# Patient Record
Sex: FEMALE | Race: WHITE | NOT HISPANIC OR LATINO | Employment: UNEMPLOYED | RURAL
[De-identification: names, ages, dates, MRNs, and addresses within clinical notes are randomized per-mention and may not be internally consistent; named-entity substitution may affect disease eponyms.]

---

## 2024-03-14 ENCOUNTER — OFFICE VISIT (OUTPATIENT)
Dept: FAMILY MEDICINE CLINIC | Facility: CLINIC | Age: 54
End: 2024-03-14
Payer: MEDICAID

## 2024-03-14 VITALS
OXYGEN SATURATION: 98 % | WEIGHT: 206 LBS | RESPIRATION RATE: 18 BRPM | HEART RATE: 96 BPM | HEIGHT: 64 IN | DIASTOLIC BLOOD PRESSURE: 78 MMHG | BODY MASS INDEX: 35.17 KG/M2 | SYSTOLIC BLOOD PRESSURE: 118 MMHG | TEMPERATURE: 97 F

## 2024-03-14 DIAGNOSIS — N95.1 MENOPAUSE SYNDROME: ICD-10-CM

## 2024-03-14 DIAGNOSIS — F51.01 PRIMARY INSOMNIA: ICD-10-CM

## 2024-03-14 DIAGNOSIS — E78.00 ELEVATED LDL CHOLESTEROL LEVEL: ICD-10-CM

## 2024-03-14 DIAGNOSIS — F33.1 MODERATE EPISODE OF RECURRENT MAJOR DEPRESSIVE DISORDER: ICD-10-CM

## 2024-03-14 DIAGNOSIS — Z00.00 ENCOUNTER FOR ANNUAL PHYSICAL EXAM: ICD-10-CM

## 2024-03-14 DIAGNOSIS — Z12.11 SCREENING FOR COLON CANCER: ICD-10-CM

## 2024-03-14 DIAGNOSIS — J30.2 SEASONAL ALLERGIES: ICD-10-CM

## 2024-03-14 DIAGNOSIS — R53.82 CHRONIC FATIGUE: ICD-10-CM

## 2024-03-14 DIAGNOSIS — E11.65 TYPE 2 DIABETES MELLITUS WITH HYPERGLYCEMIA, WITHOUT LONG-TERM CURRENT USE OF INSULIN: ICD-10-CM

## 2024-03-14 DIAGNOSIS — Z11.59 ENCOUNTER FOR HEPATITIS C SCREENING TEST FOR LOW RISK PATIENT: ICD-10-CM

## 2024-03-14 DIAGNOSIS — Z76.89 ENCOUNTER TO ESTABLISH CARE: Primary | ICD-10-CM

## 2024-03-14 DIAGNOSIS — I10 PRIMARY HYPERTENSION: ICD-10-CM

## 2024-03-14 DIAGNOSIS — E55.9 VITAMIN D DEFICIENCY: ICD-10-CM

## 2024-03-14 DIAGNOSIS — Z23 NEED FOR PNEUMOCOCCAL 20-VALENT CONJUGATE VACCINATION: ICD-10-CM

## 2024-03-14 RX ORDER — ESTRADIOL 1 MG/1
TABLET ORAL
COMMUNITY
End: 2024-03-14 | Stop reason: SDUPTHER

## 2024-03-14 RX ORDER — METFORMIN HYDROCHLORIDE 500 MG/1
1000 TABLET, EXTENDED RELEASE ORAL 2 TIMES DAILY
Qty: 180 TABLET | Refills: 1 | Status: SHIPPED | OUTPATIENT
Start: 2024-03-14

## 2024-03-14 RX ORDER — ZOLPIDEM TARTRATE 10 MG/1
10 TABLET ORAL NIGHTLY PRN
Qty: 30 TABLET | Refills: 2 | Status: SHIPPED | OUTPATIENT
Start: 2024-03-14

## 2024-03-14 RX ORDER — SERTRALINE HYDROCHLORIDE 100 MG/1
1 TABLET, FILM COATED ORAL
COMMUNITY
End: 2024-03-14 | Stop reason: SDUPTHER

## 2024-03-14 RX ORDER — ESTRADIOL 1 MG/1
1 TABLET ORAL DAILY
Qty: 90 TABLET | Refills: 1 | Status: SHIPPED | OUTPATIENT
Start: 2024-03-14

## 2024-03-14 RX ORDER — METFORMIN HYDROCHLORIDE 500 MG/1
TABLET, EXTENDED RELEASE ORAL
COMMUNITY
End: 2024-03-14 | Stop reason: SDUPTHER

## 2024-03-14 RX ORDER — SERTRALINE HYDROCHLORIDE 100 MG/1
100 TABLET, FILM COATED ORAL
Qty: 90 TABLET | Refills: 1 | Status: SHIPPED | OUTPATIENT
Start: 2024-03-14

## 2024-03-14 RX ORDER — FEXOFENADINE HCL 180 MG/1
180 TABLET ORAL DAILY
Qty: 90 TABLET | Refills: 1 | Status: SHIPPED | OUTPATIENT
Start: 2024-03-14 | End: 2024-03-14

## 2024-03-14 RX ORDER — LORATADINE 10 MG/1
10 TABLET ORAL DAILY
Qty: 30 TABLET | Refills: 3 | Status: SHIPPED | OUTPATIENT
Start: 2024-03-14

## 2024-03-14 RX ORDER — LISINOPRIL 2.5 MG/1
2.5 TABLET ORAL DAILY
Qty: 90 TABLET | Refills: 1 | Status: SHIPPED | OUTPATIENT
Start: 2024-03-14

## 2024-03-14 NOTE — PROGRESS NOTES
"Chief Complaint   Patient presents with    [facility]     New patient         Amarilys Durbin is a 53 y.o. female who presents today to [facility].    HPI   She and her  just moved here and need a new PCP.   She had a hysterectomy in 2009 and is still suffering menopausal syndrome.   She has been treated effectively for depression/anxiety with zoloft since 1995.  She is a type 2 diabetic somewhat noncompliant with medication and diet. She is also not on an ACE to protect her kidneys.   She is requesting to get back on ambien for insomnia.   She has seasonal allergies.   Allergies   Allergen Reactions    Hydrocodone Itching and Hives         OBJECTIVE:  Vitals:    03/14/24 1357   BP: 118/78   Pulse: 96   Resp: 18   Temp: 97 °F (36.1 °C)   TempSrc: Temporal   SpO2: 98%   Weight: 93.4 kg (206 lb)   Height: 162.6 cm (64\")     Physical Exam    Class 2 Severe Obesity (BMI >=35 and <=39.9). Obesity-related health conditions include the following: diabetes mellitus. Obesity is newly identified. BMI is is above average; BMI management plan is completed. We discussed low calorie, low carb based diet program, portion control, and increasing exercise.          ASSESSMENT/ PLAN:    Diagnoses and all orders for this visit:    1. Encounter to establish care (Primary)    2. Screening for colon cancer  -     Cologuard - Stool, Per Rectum; Future    3. Primary hypertension  -     lisinopril (Zestril) 2.5 MG tablet; Take 1 tablet by mouth Daily.  Dispense: 90 tablet; Refill: 1  -     CBC Auto Differential; Future  -     Comprehensive Metabolic Panel; Future    4. Type 2 diabetes mellitus with hyperglycemia, without long-term current use of insulin  -     metFORMIN ER (GLUCOPHAGE-XR) 500 MG 24 hr tablet; Take 2 tablets by mouth 2 (Two) Times a Day.  Dispense: 180 tablet; Refill: 1  -     Hemoglobin A1c; Future  -     MicroAlbumin, Urine, Random - Urine, Clean Catch; Future    5. Menopause syndrome  -     " estradiol (ESTRACE) 1 MG tablet; Take 1 tablet by mouth Daily.  Dispense: 90 tablet; Refill: 1  -     FSH & LH; Future  -     Prolactin; Future  -     Testosterone; Future  -     Estrogens, Total; Future  -     Progesterone; Future    6. Moderate episode of recurrent major depressive disorder  -     sertraline (ZOLOFT) 100 MG tablet; Take 1 tablet by mouth every night at bedtime.  Dispense: 90 tablet; Refill: 1    7. Seasonal allergies  -     Discontinue: fexofenadine (Allegra Allergy) 180 MG tablet; Take 1 tablet by mouth Daily.  Dispense: 90 tablet; Refill: 1  -     loratadine (Claritin) 10 MG tablet; Take 1 tablet by mouth Daily.  Dispense: 30 tablet; Refill: 3    8. Primary insomnia  -     zolpidem (AMBIEN) 10 MG tablet; Take 1 tablet by mouth At Night As Needed for Sleep.  Dispense: 30 tablet; Refill: 2    9. Need for pneumococcal 20-valent conjugate vaccination  -     Pneumococcal Conjugate Vaccine 20-Valent (PCV20)    10. Encounter for annual physical exam  -     CBC Auto Differential; Future  -     Comprehensive Metabolic Panel; Future  -     Lipid Panel With LDL / HDL Ratio; Future  -     T4 & TSH ([facility]); Future    11. Vitamin D deficiency    12. Elevated LDL cholesterol level  -     CBC Auto Differential; Future  -     Comprehensive Metabolic Panel; Future  -     Lipid Panel With LDL / HDL Ratio; Future    13. Chronic fatigue  -     T4 & TSH ([facility]); Future      Procedures     Management Plan:   She will come in tomorrow for fasting labs.  An After Visit Summary was printed and given to the patient at discharge.    Follow-up: Return for ANNUAL PHYSICAL WITH LABS PRIOR.    I spent 50 minutes caring for Marisela on this date of service. This time includes time spent by me in the following activities: preparing for the visit, obtaining and/or reviewing a separately obtained history, performing a medically appropriate examination and/or evaluation, counseling and educating the patient/family/caregiver,  ordering medications, tests, or procedures, documenting information in the medical record, and independently interpreting results and communicating that information with the patient/family/caregiver.      Manpreet Kidd, APRN 3/14/2024 16:59 CDT  This note was electronically signed.

## 2024-03-15 ENCOUNTER — PATIENT ROUNDING (BHMG ONLY) (OUTPATIENT)
Dept: FAMILY MEDICINE CLINIC | Facility: CLINIC | Age: 54
End: 2024-03-15
Payer: MEDICAID

## 2024-03-21 ENCOUNTER — OFFICE VISIT (OUTPATIENT)
Dept: FAMILY MEDICINE CLINIC | Facility: CLINIC | Age: 54
End: 2024-03-21
Payer: MEDICAID

## 2024-03-21 VITALS
TEMPERATURE: 97 F | HEART RATE: 93 BPM | DIASTOLIC BLOOD PRESSURE: 80 MMHG | BODY MASS INDEX: 35 KG/M2 | RESPIRATION RATE: 18 BRPM | OXYGEN SATURATION: 98 % | WEIGHT: 205 LBS | SYSTOLIC BLOOD PRESSURE: 118 MMHG | HEIGHT: 64 IN

## 2024-03-21 DIAGNOSIS — Z79.899 LONG-TERM USE OF HIGH-RISK MEDICATION: ICD-10-CM

## 2024-03-21 DIAGNOSIS — Z78.0 POST-MENOPAUSAL: ICD-10-CM

## 2024-03-21 DIAGNOSIS — K21.00 GASTROESOPHAGEAL REFLUX DISEASE WITH ESOPHAGITIS WITHOUT HEMORRHAGE: ICD-10-CM

## 2024-03-21 DIAGNOSIS — E66.01 CLASS 2 SEVERE OBESITY DUE TO EXCESS CALORIES WITH SERIOUS COMORBIDITY AND BODY MASS INDEX (BMI) OF 35.0 TO 35.9 IN ADULT: ICD-10-CM

## 2024-03-21 DIAGNOSIS — F33.1 MAJOR DEPRESSIVE DISORDER, RECURRENT, MODERATE: ICD-10-CM

## 2024-03-21 DIAGNOSIS — Z12.31 ENCOUNTER FOR SCREENING MAMMOGRAM FOR MALIGNANT NEOPLASM OF BREAST: ICD-10-CM

## 2024-03-21 DIAGNOSIS — Z23 NEED FOR SHINGLES VACCINE: ICD-10-CM

## 2024-03-21 DIAGNOSIS — E78.2 MIXED HYPERLIPIDEMIA: ICD-10-CM

## 2024-03-21 DIAGNOSIS — E11.65 TYPE 2 DIABETES MELLITUS WITH HYPERGLYCEMIA, WITHOUT LONG-TERM CURRENT USE OF INSULIN: ICD-10-CM

## 2024-03-21 DIAGNOSIS — Z00.00 ENCOUNTER FOR ANNUAL PHYSICAL EXAM: Primary | ICD-10-CM

## 2024-03-21 DIAGNOSIS — F51.01 PRIMARY INSOMNIA: ICD-10-CM

## 2024-03-21 RX ORDER — ZOSTER VACCINE RECOMBINANT, ADJUVANTED 50 MCG/0.5
0.5 KIT INTRAMUSCULAR ONCE
Qty: 1 EACH | Refills: 1 | Status: SHIPPED | OUTPATIENT
Start: 2024-03-21 | End: 2024-03-21

## 2024-03-21 RX ORDER — OMEPRAZOLE 20 MG/1
20 CAPSULE, DELAYED RELEASE ORAL DAILY
Qty: 30 CAPSULE | Refills: 5 | Status: SHIPPED | OUTPATIENT
Start: 2024-03-21

## 2024-03-21 RX ORDER — FENOFIBRATE 145 MG/1
145 TABLET, COATED ORAL DAILY
Qty: 90 TABLET | Refills: 1 | Status: SHIPPED | OUTPATIENT
Start: 2024-03-21

## 2024-03-21 RX ORDER — ATORVASTATIN CALCIUM 20 MG/1
20 TABLET, FILM COATED ORAL DAILY
Qty: 90 TABLET | Refills: 1 | Status: SHIPPED | OUTPATIENT
Start: 2024-03-21

## 2024-03-21 NOTE — PROGRESS NOTES
"Chief Complaint   Patient presents with    Annual Exam        Subjective   Ghislaine Durbin is a 53 y.o. female who presents today for annual physical exam.     HPI   She has no complaints today. This visit is to ensure continuity of care.     Allergies   Allergen Reactions    Hydrocodone Itching and Hives         OBJECTIVE:  Vitals:    03/21/24 0911   BP: 118/80   Pulse: 93   Resp: 18   Temp: 97 °F (36.1 °C)   TempSrc: Temporal   SpO2: 98%   Weight: 93 kg (205 lb)   Height: 162.6 cm (64\")     Physical Exam  Vitals and nursing note reviewed.   Constitutional:       Appearance: Normal appearance.   HENT:      Head: Normocephalic and atraumatic.      Right Ear: Tympanic membrane, ear canal and external ear normal.      Left Ear: Tympanic membrane, ear canal and external ear normal.      Nose: Nose normal.      Mouth/Throat:      Mouth: Mucous membranes are moist.      Pharynx: Oropharynx is clear.   Eyes:      Extraocular Movements: Extraocular movements intact.      Pupils: Pupils are equal, round, and reactive to light.   Cardiovascular:      Rate and Rhythm: Normal rate and regular rhythm.      Pulses: Normal pulses.      Heart sounds: Normal heart sounds.   Pulmonary:      Effort: Pulmonary effort is normal.      Breath sounds: Normal breath sounds.   Abdominal:      General: Abdomen is flat. Bowel sounds are normal.      Palpations: Abdomen is soft.      Tenderness: There is abdominal tenderness in the left upper quadrant.   Musculoskeletal:         General: Normal range of motion.      Cervical back: Normal range of motion and neck supple.   Skin:     General: Skin is warm and dry.      Capillary Refill: Capillary refill takes less than 2 seconds.   Neurological:      General: No focal deficit present.      Mental Status: She is alert and oriented to person, place, and time.   Psychiatric:         Mood and Affect: Mood normal.         Behavior: Behavior normal.         Thought Content: Thought content " normal.         Judgment: Judgment normal.       Class 2 Severe Obesity (BMI >=35 and <=39.9). Obesity-related health conditions include the following: diabetes mellitus, dyslipidemias, and GERD. Obesity is newly identified. BMI is is above average; BMI management plan is completed. We discussed low calorie, low carb based diet program, portion control, and increasing exercise.   A1C 6.9  Trig-255  LDL-105  Post menopausal            ASSESSMENT/ PLAN:    Diagnoses and all orders for this visit:    1. Encounter for annual physical exam (Primary)    2. Mixed hyperlipidemia  -     fenofibrate (Tricor) 145 MG tablet; Take 1 tablet by mouth Daily.  Dispense: 90 tablet; Refill: 1  -     atorvastatin (Lipitor) 20 MG tablet; Take 1 tablet by mouth Daily.  Dispense: 90 tablet; Refill: 1    3. Gastroesophageal reflux disease with esophagitis without hemorrhage  -     omeprazole (priLOSEC) 20 MG capsule; Take 1 capsule by mouth Daily.  Dispense: 30 capsule; Refill: 5    4. Encounter for screening mammogram for malignant neoplasm of breast  -     Mammo Screening Digital Tomosynthesis Bilateral With CAD; Future    5. Need for shingles vaccine  -     Zoster Vac Recomb Adjuvanted (Shingrix) 50 MCG/0.5ML reconstituted suspension; Inject 0.5 mL into the appropriate muscle as directed by prescriber 1 (One) Time for 1 dose.  Dispense: 1 each; Refill: 1    6. Major depressive disorder, recurrent, moderate    7. Primary insomnia    8. Post-menopausal  -     DEXA Bone Density Axial; Future    9. Type 2 diabetes mellitus with hyperglycemia, without long-term current use of insulin    10. Class 2 severe obesity due to excess calories with serious comorbidity and body mass index (BMI) of 35.0 to 35.9 in adult      Procedures     Management Plan:   MEDICATION Instructions:     Encouraged patient to continue routine medicines as prescribed and maintain compliance. Patient instructed to report any adverse side effects or reactions to medicines  promptly to the office. Patient instructed to make us aware of any OTC or herbal meds or supplement use.           DIET Recommendations:       Patient instructed and provided information on the following nutrition and diet recommendations: Calorie restriction for weight reduction and maintenance. Necessity for adequate daily intake of fluids/water. Also, diet information provided in AVS for specifics.           EXERCISE Instructions:         Discussed with patient the need for routine aerobic activity for cardiovascular fitness, 3 times a week for about 30 minutes. Daily exercise for increased fitness and weight reduction goals.         HEALTH MAINTENANCE:                 Counseling provided to patient/family about routine health maintenance and ANNUAL physicals/labs. Counseling on recommended Vaccinations appropriate for age needed.   An After Visit Summary was printed and given to the patient at discharge.    Follow-up: Return in about 3 months (around 6/21/2024) for Next scheduled follow up with diabetes labs prior.         Manpreet Kidd, SOLANGE 3/21/2024 10:43 CDT  This note was electronically signed.

## 2024-03-22 ENCOUNTER — PATIENT ROUNDING (BHMG ONLY) (OUTPATIENT)
Dept: FAMILY MEDICINE CLINIC | Facility: CLINIC | Age: 54
End: 2024-03-22
Payer: MEDICAID

## 2024-03-22 NOTE — PROGRESS NOTES
"March 22, 2024    Hello, may I speak with Ghislaine Durbin?    My name is GIANCARLO    I am  with [facility]  [address] [id number] [phone].    Before we get started may I verify your date of birth? 1970    I am calling to officially welcome you to our practice and ask about your recent visit. Is this a good time to talk? yes    Tell me about your visit with us. What things went well?  everything went great, elin office\"        We're always looking for ways to make our patients' experiences even better. Do you have recommendations on ways we may improve?  no    Overall were you satisfied with your first visit to our practice? yes       I appreciate you taking the time to speak with me today. Is there anything else I can do for you? no      Thank you, and have a great day.      "

## 2024-03-26 LAB
NCCN CRITERIA FLAG: NORMAL
TYRER CUZICK SCORE: 6.6

## 2024-03-27 ENCOUNTER — HOSPITAL ENCOUNTER (OUTPATIENT)
Dept: BONE DENSITY | Facility: HOSPITAL | Age: 54
Discharge: HOME | End: 2024-03-27
Payer: MEDICAID

## 2024-03-27 ENCOUNTER — APPOINTMENT (OUTPATIENT)
Dept: OTHER | Facility: HOSPITAL | Age: 54
End: 2024-03-27
Payer: MEDICAID

## 2024-03-27 ENCOUNTER — HOSPITAL ENCOUNTER (OUTPATIENT)
Dept: MAMMOGRAPHY | Facility: HOSPITAL | Age: 54
Discharge: HOME | End: 2024-03-27
Payer: MEDICAID

## 2024-03-27 DIAGNOSIS — Z78.0 POST-MENOPAUSAL: ICD-10-CM

## 2024-03-27 DIAGNOSIS — Z12.31 ENCOUNTER FOR SCREENING MAMMOGRAM FOR MALIGNANT NEOPLASM OF BREAST: ICD-10-CM

## 2024-03-27 PROCEDURE — 77080 DXA BONE DENSITY AXIAL: CPT

## 2024-03-27 PROCEDURE — 77063 BREAST TOMOSYNTHESIS BI: CPT

## 2024-03-27 PROCEDURE — 77067 SCR MAMMO BI INCL CAD: CPT

## 2024-03-29 LAB — DRUGS UR: NORMAL

## 2024-05-04 DIAGNOSIS — E11.65 TYPE 2 DIABETES MELLITUS WITH HYPERGLYCEMIA, WITHOUT LONG-TERM CURRENT USE OF INSULIN: ICD-10-CM

## 2024-05-06 RX ORDER — METFORMIN HYDROCHLORIDE 500 MG/1
1000 TABLET, EXTENDED RELEASE ORAL 2 TIMES DAILY
Qty: 360 TABLET | Refills: 1 | Status: SHIPPED | OUTPATIENT
Start: 2024-05-06

## 2024-06-03 ENCOUNTER — OFFICE VISIT (OUTPATIENT)
Dept: FAMILY MEDICINE CLINIC | Facility: CLINIC | Age: 54
End: 2024-06-03
Payer: MEDICAID

## 2024-06-03 VITALS
OXYGEN SATURATION: 98 % | HEART RATE: 93 BPM | BODY MASS INDEX: 34.49 KG/M2 | SYSTOLIC BLOOD PRESSURE: 131 MMHG | RESPIRATION RATE: 18 BRPM | TEMPERATURE: 97 F | WEIGHT: 202 LBS | DIASTOLIC BLOOD PRESSURE: 84 MMHG | HEIGHT: 64 IN

## 2024-06-03 DIAGNOSIS — H66.002 NON-RECURRENT ACUTE SUPPURATIVE OTITIS MEDIA OF LEFT EAR WITHOUT SPONTANEOUS RUPTURE OF TYMPANIC MEMBRANE: Primary | ICD-10-CM

## 2024-06-03 DIAGNOSIS — J30.2 SEASONAL ALLERGIES: ICD-10-CM

## 2024-06-03 PROCEDURE — 1160F RVW MEDS BY RX/DR IN RCRD: CPT | Performed by: NURSE PRACTITIONER

## 2024-06-03 PROCEDURE — 99213 OFFICE O/P EST LOW 20 MIN: CPT | Performed by: NURSE PRACTITIONER

## 2024-06-03 PROCEDURE — 1159F MED LIST DOCD IN RCRD: CPT | Performed by: NURSE PRACTITIONER

## 2024-06-03 PROCEDURE — 3044F HG A1C LEVEL LT 7.0%: CPT | Performed by: NURSE PRACTITIONER

## 2024-06-03 PROCEDURE — 96372 THER/PROPH/DIAG INJ SC/IM: CPT | Performed by: NURSE PRACTITIONER

## 2024-06-03 PROCEDURE — 1125F AMNT PAIN NOTED PAIN PRSNT: CPT | Performed by: NURSE PRACTITIONER

## 2024-06-03 RX ORDER — FEXOFENADINE HCL 180 MG/1
TABLET ORAL
COMMUNITY
Start: 2024-05-31 | End: 2024-06-03

## 2024-06-03 RX ORDER — AMOXICILLIN 500 MG/1
500 CAPSULE ORAL 2 TIMES DAILY
Qty: 20 CAPSULE | Refills: 0 | Status: SHIPPED | OUTPATIENT
Start: 2024-06-03

## 2024-06-03 RX ORDER — CEFTRIAXONE 1 G/1
1 INJECTION, POWDER, FOR SOLUTION INTRAMUSCULAR; INTRAVENOUS EVERY 24 HOURS
Status: COMPLETED | OUTPATIENT
Start: 2024-06-03 | End: 2024-06-03

## 2024-06-03 RX ORDER — MONTELUKAST SODIUM 10 MG/1
10 TABLET ORAL NIGHTLY
Qty: 30 TABLET | Refills: 1 | Status: SHIPPED | OUTPATIENT
Start: 2024-06-03

## 2024-06-03 RX ADMIN — CEFTRIAXONE 1 G: 1 INJECTION, POWDER, FOR SOLUTION INTRAMUSCULAR; INTRAVENOUS at 15:23

## 2024-06-03 NOTE — PROGRESS NOTES
"Chief Complaint   Patient presents with    Ear Fullness     Pain and  drainage         Subjective   Ghislaine Durbin is a 53 y.o. female who presents today for left ear pain and ears feeling \"stopped up.\"     HPI   She feels like both ears are stopped up and has had black exudate coming out of them. The left ear has been painful x 3 days. She does have seasonal allergies and has had sinus drainage and pressure on and off. She has been using debrox and flonase.     Allergies   Allergen Reactions    Hydrocodone Itching and Hives         OBJECTIVE:  Vitals:    06/03/24 1455   BP: 131/84   BP Location: Right arm   Patient Position: Sitting   Cuff Size: Large Adult   Pulse: 93   Resp: 18   Temp: 97 °F (36.1 °C)   TempSrc: Temporal   SpO2: 98%   Weight: 91.6 kg (202 lb)   Height: 162.6 cm (64\")     Physical Exam  Vitals and nursing note reviewed.   Constitutional:       Appearance: Normal appearance.   HENT:      Right Ear: Tympanic membrane is bulging.      Left Ear: A middle ear effusion is present.      Ears:      Comments: Purulent drainage noted behind left TM.   Right TM bulging with clear fluid level.   Pulmonary:      Effort: Pulmonary effort is normal.      Breath sounds: Normal breath sounds and air entry.   Neurological:      Mental Status: She is alert.                    ASSESSMENT/ PLAN:    Diagnoses and all orders for this visit:    1. Non-recurrent acute suppurative otitis media of left ear without spontaneous rupture of tympanic membrane (Primary)  -     amoxicillin (AMOXIL) 500 MG capsule; Take 1 capsule by mouth 2 (Two) Times a Day.  Dispense: 20 capsule; Refill: 0  -     cefTRIAXone (ROCEPHIN) injection 1 g    2. Seasonal allergies  -     montelukast (Singulair) 10 MG tablet; Take 1 tablet by mouth Every Night.  Dispense: 30 tablet; Refill: 1      Procedures     Management Plan:   Complete all antibiotics.   An After Visit Summary was printed and given to the patient at discharge.    Follow-up: " Return if symptoms worsen or fail to improve.         Manpreet Kidd, APRN 6/3/2024 15:37 CDT  This note was electronically signed.

## 2024-07-08 ENCOUNTER — OFFICE VISIT (OUTPATIENT)
Dept: FAMILY MEDICINE CLINIC | Facility: CLINIC | Age: 54
End: 2024-07-08
Payer: MEDICAID

## 2024-07-08 VITALS
RESPIRATION RATE: 18 BRPM | BODY MASS INDEX: 34.15 KG/M2 | DIASTOLIC BLOOD PRESSURE: 87 MMHG | HEIGHT: 64 IN | TEMPERATURE: 97 F | WEIGHT: 200 LBS | OXYGEN SATURATION: 98 % | HEART RATE: 95 BPM | SYSTOLIC BLOOD PRESSURE: 129 MMHG

## 2024-07-08 DIAGNOSIS — J30.2 SEASONAL ALLERGIES: ICD-10-CM

## 2024-07-08 DIAGNOSIS — H65.23 BILATERAL CHRONIC SEROUS OTITIS MEDIA: ICD-10-CM

## 2024-07-08 DIAGNOSIS — H93.11 TINNITUS OF RIGHT EAR: ICD-10-CM

## 2024-07-08 DIAGNOSIS — E78.2 MIXED HYPERLIPIDEMIA: ICD-10-CM

## 2024-07-08 DIAGNOSIS — F33.1 MODERATE EPISODE OF RECURRENT MAJOR DEPRESSIVE DISORDER: ICD-10-CM

## 2024-07-08 DIAGNOSIS — F51.01 PRIMARY INSOMNIA: ICD-10-CM

## 2024-07-08 DIAGNOSIS — R42 DIZZINESS: ICD-10-CM

## 2024-07-08 DIAGNOSIS — E11.65 TYPE 2 DIABETES MELLITUS WITH HYPERGLYCEMIA, WITHOUT LONG-TERM CURRENT USE OF INSULIN: Primary | ICD-10-CM

## 2024-07-08 LAB
EXPIRATION DATE: ABNORMAL
HBA1C MFR BLD: 7.8 % (ref 4.5–5.7)
Lab: ABNORMAL

## 2024-07-08 PROCEDURE — 1159F MED LIST DOCD IN RCRD: CPT | Performed by: NURSE PRACTITIONER

## 2024-07-08 PROCEDURE — 99213 OFFICE O/P EST LOW 20 MIN: CPT | Performed by: NURSE PRACTITIONER

## 2024-07-08 PROCEDURE — 1126F AMNT PAIN NOTED NONE PRSNT: CPT | Performed by: NURSE PRACTITIONER

## 2024-07-08 PROCEDURE — 83036 HEMOGLOBIN GLYCOSYLATED A1C: CPT | Performed by: NURSE PRACTITIONER

## 2024-07-08 PROCEDURE — 1160F RVW MEDS BY RX/DR IN RCRD: CPT | Performed by: NURSE PRACTITIONER

## 2024-07-08 PROCEDURE — 3051F HG A1C>EQUAL 7.0%<8.0%: CPT | Performed by: NURSE PRACTITIONER

## 2024-07-08 RX ORDER — CETIRIZINE HYDROCHLORIDE 10 MG/1
10 TABLET ORAL DAILY
Qty: 30 TABLET | Refills: 5 | Status: SHIPPED | OUTPATIENT
Start: 2024-07-08

## 2024-07-08 RX ORDER — METFORMIN HYDROCHLORIDE 500 MG/1
1000 TABLET, EXTENDED RELEASE ORAL 2 TIMES DAILY
Qty: 360 TABLET | Refills: 1 | Status: SHIPPED | OUTPATIENT
Start: 2024-07-08

## 2024-07-08 RX ORDER — MECLIZINE HYDROCHLORIDE 25 MG/1
25 TABLET ORAL 3 TIMES DAILY PRN
Qty: 30 TABLET | Refills: 2 | Status: SHIPPED | OUTPATIENT
Start: 2024-07-08

## 2024-07-08 RX ORDER — SERTRALINE HYDROCHLORIDE 100 MG/1
100 TABLET, FILM COATED ORAL
Qty: 90 TABLET | Refills: 1 | Status: SHIPPED | OUTPATIENT
Start: 2024-07-08

## 2024-07-08 RX ORDER — ATORVASTATIN CALCIUM 20 MG/1
20 TABLET, FILM COATED ORAL DAILY
Qty: 90 TABLET | Refills: 1 | Status: SHIPPED | OUTPATIENT
Start: 2024-07-08

## 2024-07-08 NOTE — TELEPHONE ENCOUNTER
Rx Refill Note  Requested Prescriptions     Pending Prescriptions Disp Refills    zolpidem (AMBIEN) 10 MG tablet [Pharmacy Med Name: Zolpidem Tartrate 10 MG Oral Tablet] 30 tablet 0     Sig: TAKE 1 TABLET BY MOUTH AT NIGHT AS NEEDED FOR SLEEP      Last office visit with prescribing clinician: 7/8/2024   Last telemedicine visit with prescribing clinician: Visit date not found   Next office visit with prescribing clinician: 10/8/2024       Jayna Lau MA  07/08/24, 13:07 CDT

## 2024-07-08 NOTE — PROGRESS NOTES
"Answers submitted by the patient for this visit:  Primary Reason for Visit (Submitted on 7/5/2024)  What is the primary reason for your visit?: Diabetes  Diabetes Questionnaire (Submitted on 7/5/2024)  Chief Complaint: Diabetes problem  Diabetes type: type 2  MedicAlert ID: No  Disease duration: 13 Years  Treatment compliance: some of the time  Symptom course: stable  blurred vision: No  foot paresthesias: Yes  foot ulcerations: No  polydipsia: Yes  polyuria: No  weight loss: No  blackouts: No  hospitalization: No  nocturnal hypoglycemia: No  required assistance: No  required glucagon: No  confusion: Yes  headaches: No  speech difficulty: No  sweats: No  tremors: Yes  Current diet: generally healthy, generally unhealthy  Meal planning: none  Exercise: rarely  Eye exam current: No  Sees podiatrist: No  Chief Complaint   Patient presents with    Diabetes     A1c recheck        Subjective   Ghislaine Durbin is a 53 y.o. female who presents today for follow up A1C.    HPI   She admits that her diet has not been good over the past three months. She also admits to getting little exercise.   She does have tinnitus in her left ear that she would like to be checked for. She also is having trouble with allergies and facial swelling, as well as fluid on her ears.   She is also needing some medication refills.   Allergies   Allergen Reactions    Hydrocodone Itching and Hives         OBJECTIVE:  Vitals:    07/08/24 0827   BP: 129/87   BP Location: Left arm   Patient Position: Sitting   Cuff Size: Large Adult   Pulse: 95   Resp: 18   Temp: 97 °F (36.1 °C)   TempSrc: Temporal   SpO2: 98%   Weight: 90.7 kg (200 lb)   Height: 162.6 cm (64\")     Physical Exam  Vitals and nursing note reviewed.   Constitutional:       Appearance: Normal appearance.   HENT:      Right Ear: Tympanic membrane is bulging.      Left Ear: Tympanic membrane is bulging.      Mouth/Throat:      Pharynx: Posterior oropharyngeal erythema present. "   Cardiovascular:      Rate and Rhythm: Normal rate and regular rhythm.      Pulses: Normal pulses.      Heart sounds: Normal heart sounds.   Pulmonary:      Effort: Pulmonary effort is normal.      Breath sounds: Normal breath sounds.   Skin:     General: Skin is warm and dry.   Neurological:      General: No focal deficit present.      Mental Status: She is alert and oriented to person, place, and time.   Psychiatric:         Mood and Affect: Mood normal.         Behavior: Behavior normal.         Thought Content: Thought content normal.         Judgment: Judgment normal.                    ASSESSMENT/ PLAN:    Diagnoses and all orders for this visit:    1. Type 2 diabetes mellitus with hyperglycemia, without long-term current use of insulin (Primary)  -     POCT glycated hemoglobin, total  -     metFORMIN ER (GLUCOPHAGE-XR) 500 MG 24 hr tablet; Take 2 tablets by mouth 2 (Two) Times a Day.  Dispense: 360 tablet; Refill: 1    2. Dizziness  -     meclizine (ANTIVERT) 25 MG tablet; Take 1 tablet by mouth 3 (Three) Times a Day As Needed for Dizziness.  Dispense: 30 tablet; Refill: 2  -     Ambulatory Referral to ENT (Otolaryngology)    3. Tinnitus of right ear  -     Ambulatory Referral to ENT (Otolaryngology)    4. Bilateral chronic serous otitis media  -     Ambulatory Referral to ENT (Otolaryngology)    5. Seasonal allergies  -     cetirizine (zyrTEC) 10 MG tablet; Take 1 tablet by mouth Daily.  Dispense: 30 tablet; Refill: 5    6. Moderate episode of recurrent major depressive disorder  -     sertraline (ZOLOFT) 100 MG tablet; Take 1 tablet by mouth every night at bedtime.  Dispense: 90 tablet; Refill: 1    7. Mixed hyperlipidemia  -     atorvastatin (Lipitor) 20 MG tablet; Take 1 tablet by mouth Daily.  Dispense: 90 tablet; Refill: 1      Procedures     Management Plan:     An After Visit Summary was printed and given to the patient at discharge.    Follow-up: Return in about 3 months (around 10/8/2024) for  Recheck with labs prior.         Manpreet Kidd, APRN 7/8/2024 10:34 CDT  This note was electronically signed.

## 2024-07-09 RX ORDER — ZOLPIDEM TARTRATE 10 MG/1
10 TABLET ORAL NIGHTLY PRN
Qty: 30 TABLET | Refills: 2 | Status: SHIPPED | OUTPATIENT
Start: 2024-07-09

## 2024-07-29 ENCOUNTER — OFFICE VISIT (OUTPATIENT)
Dept: FAMILY MEDICINE CLINIC | Facility: CLINIC | Age: 54
End: 2024-07-29
Payer: MEDICAID

## 2024-07-29 VITALS
TEMPERATURE: 97.4 F | HEIGHT: 64 IN | BODY MASS INDEX: 34.15 KG/M2 | RESPIRATION RATE: 19 BRPM | HEART RATE: 105 BPM | SYSTOLIC BLOOD PRESSURE: 119 MMHG | WEIGHT: 200 LBS | OXYGEN SATURATION: 95 % | DIASTOLIC BLOOD PRESSURE: 81 MMHG

## 2024-07-29 DIAGNOSIS — H66.002 NON-RECURRENT ACUTE SUPPURATIVE OTITIS MEDIA OF LEFT EAR WITHOUT SPONTANEOUS RUPTURE OF TYMPANIC MEMBRANE: Primary | ICD-10-CM

## 2024-07-29 PROCEDURE — 1126F AMNT PAIN NOTED NONE PRSNT: CPT | Performed by: NURSE PRACTITIONER

## 2024-07-29 PROCEDURE — 99213 OFFICE O/P EST LOW 20 MIN: CPT | Performed by: NURSE PRACTITIONER

## 2024-07-29 PROCEDURE — 3051F HG A1C>EQUAL 7.0%<8.0%: CPT | Performed by: NURSE PRACTITIONER

## 2024-07-29 RX ORDER — AMOXICILLIN AND CLAVULANATE POTASSIUM 875; 125 MG/1; MG/1
1 TABLET, FILM COATED ORAL 2 TIMES DAILY
Qty: 20 TABLET | Refills: 0 | Status: SHIPPED | OUTPATIENT
Start: 2024-07-29 | End: 2024-08-08

## 2024-07-29 NOTE — PROGRESS NOTES
"Chief Complaint   Patient presents with    Earache     Left ear, black drainage, shooting pain        Subjective   Ghislaine Peyton Durbin is a 53 y.o. female who presents today for the following: right ear pain.    HPI   She has been having right ear pain x 3 days. She has had some black drainage that she cleaned out herself. She has an appointment with ENT in October.     Allergies   Allergen Reactions    Hydrocodone Itching and Hives         OBJECTIVE:  Vitals:    07/29/24 0950   BP: 119/81   Pulse: 105   Resp: 19   Temp: 97.4 °F (36.3 °C)   TempSrc: Skin   SpO2: 95%   Weight: 90.7 kg (200 lb)   Height: 162.6 cm (64.02\")     Physical Exam  HENT:      Right Ear: Tympanic membrane is bulging.      Left Ear: Decreased hearing noted. Tenderness present. A middle ear effusion is present.                    ASSESSMENT/ PLAN:    Diagnoses and all orders for this visit:    1. Non-recurrent acute suppurative otitis media of left ear without spontaneous rupture of tympanic membrane (Primary)  -     amoxicillin-clavulanate (AUGMENTIN) 875-125 MG per tablet; Take 1 tablet by mouth 2 (Two) Times a Day for 10 days.  Dispense: 20 tablet; Refill: 0      Procedures     Management Plan:   Complete all antibiotics. Continue zyrtec and flonase.  An After Visit Summary was printed and given to the patient at discharge.    Follow-up: Return if symptoms worsen or fail to improve.         Manpreet Kidd, APRN 7/29/2024 10:26 CDT  This note was electronically signed.          "

## 2024-08-01 ENCOUNTER — CLINICAL SUPPORT (OUTPATIENT)
Dept: FAMILY MEDICINE CLINIC | Facility: CLINIC | Age: 54
End: 2024-08-01
Payer: MEDICAID

## 2024-08-01 DIAGNOSIS — H66.002 NON-RECURRENT ACUTE SUPPURATIVE OTITIS MEDIA OF LEFT EAR WITHOUT SPONTANEOUS RUPTURE OF TYMPANIC MEMBRANE: Primary | ICD-10-CM

## 2024-08-01 DIAGNOSIS — H93.11 TINNITUS OF RIGHT EAR: Primary | ICD-10-CM

## 2024-08-01 PROCEDURE — 96372 THER/PROPH/DIAG INJ SC/IM: CPT | Performed by: NURSE PRACTITIONER

## 2024-08-01 RX ORDER — CEFTRIAXONE 1 G/1
1 INJECTION, POWDER, FOR SOLUTION INTRAMUSCULAR; INTRAVENOUS EVERY 24 HOURS
Status: COMPLETED | OUTPATIENT
Start: 2024-08-01 | End: 2024-08-01

## 2024-08-01 RX ADMIN — CEFTRIAXONE 1 G: 1 INJECTION, POWDER, FOR SOLUTION INTRAMUSCULAR; INTRAVENOUS at 11:27

## 2024-08-01 NOTE — PROGRESS NOTES
Pt came in last week with ear pain, per anaid pt came in for a nurse visit for a injection of Rocephin. Pt tolerated well

## 2024-08-02 DIAGNOSIS — J30.2 SEASONAL ALLERGIES: ICD-10-CM

## 2024-08-02 NOTE — TELEPHONE ENCOUNTER
original prescription was discontinued on 6/3/2024 by Manpreet Kidd APRN for the following reason: *Therapy completed.

## 2024-08-27 ENCOUNTER — OFFICE VISIT (OUTPATIENT)
Dept: FAMILY MEDICINE CLINIC | Facility: CLINIC | Age: 54
End: 2024-08-27
Payer: MEDICAID

## 2024-08-27 VITALS
DIASTOLIC BLOOD PRESSURE: 83 MMHG | TEMPERATURE: 98.1 F | HEART RATE: 89 BPM | OXYGEN SATURATION: 97 % | WEIGHT: 197.8 LBS | BODY MASS INDEX: 33.77 KG/M2 | SYSTOLIC BLOOD PRESSURE: 137 MMHG | HEIGHT: 64 IN

## 2024-08-27 DIAGNOSIS — H66.012 NON-RECURRENT ACUTE SUPPURATIVE OTITIS MEDIA OF LEFT EAR WITH SPONTANEOUS RUPTURE OF TYMPANIC MEMBRANE: Primary | ICD-10-CM

## 2024-08-27 PROCEDURE — 1125F AMNT PAIN NOTED PAIN PRSNT: CPT | Performed by: NURSE PRACTITIONER

## 2024-08-27 PROCEDURE — 96372 THER/PROPH/DIAG INJ SC/IM: CPT | Performed by: NURSE PRACTITIONER

## 2024-08-27 PROCEDURE — 99213 OFFICE O/P EST LOW 20 MIN: CPT | Performed by: NURSE PRACTITIONER

## 2024-08-27 PROCEDURE — 1160F RVW MEDS BY RX/DR IN RCRD: CPT | Performed by: NURSE PRACTITIONER

## 2024-08-27 PROCEDURE — 1159F MED LIST DOCD IN RCRD: CPT | Performed by: NURSE PRACTITIONER

## 2024-08-27 PROCEDURE — 3051F HG A1C>EQUAL 7.0%<8.0%: CPT | Performed by: NURSE PRACTITIONER

## 2024-08-27 RX ORDER — AZELASTINE HYDROCHLORIDE 137 UG/1
SPRAY, METERED NASAL
COMMUNITY
Start: 2024-07-23

## 2024-08-27 RX ORDER — DESONIDE 0.5 MG/G
CREAM TOPICAL
COMMUNITY
Start: 2024-08-21

## 2024-08-27 RX ORDER — FEXOFENADINE HCL 180 MG/1
1 TABLET ORAL DAILY
COMMUNITY
Start: 2024-08-04

## 2024-08-27 RX ORDER — CEFUROXIME AXETIL 500 MG/1
500 TABLET ORAL 2 TIMES DAILY
Qty: 20 TABLET | Refills: 0 | Status: SHIPPED | OUTPATIENT
Start: 2024-08-27

## 2024-08-27 RX ORDER — TRIAMCINOLONE ACETONIDE 1 MG/G
CREAM TOPICAL
COMMUNITY
Start: 2024-08-21

## 2024-08-27 RX ORDER — CEFTRIAXONE 1 G/1
1 INJECTION, POWDER, FOR SOLUTION INTRAMUSCULAR; INTRAVENOUS ONCE
Status: COMPLETED | OUTPATIENT
Start: 2024-08-27 | End: 2024-08-27

## 2024-08-27 RX ORDER — ERYTHROMYCIN 5 MG/G
OINTMENT OPHTHALMIC
COMMUNITY
Start: 2024-08-13

## 2024-08-27 RX ORDER — TRAMADOL HYDROCHLORIDE 50 MG/1
50 TABLET ORAL EVERY 6 HOURS PRN
Qty: 40 TABLET | Refills: 0 | Status: SHIPPED | OUTPATIENT
Start: 2024-08-27

## 2024-08-27 RX ADMIN — CEFTRIAXONE 1 G: 1 INJECTION, POWDER, FOR SOLUTION INTRAMUSCULAR; INTRAVENOUS at 15:13

## 2024-08-27 NOTE — PROGRESS NOTES
"Chief Complaint   Patient presents with    ear infection     Pt complains of an ear infection that has been present for 4 days now. It is present in the left year.         Subjective   Ghislaine Durbin is a 53 y.o. female who presents today for the following: left ear pain.    HPI   She has been having left ear pain x 3-4 days. She has recurrent infections and is scheduled for ENT in October.     Allergies   Allergen Reactions    Hydrocodone Itching and Hives         OBJECTIVE:  Vitals:    08/27/24 1417   BP: 137/83   BP Location: Left arm   Patient Position: Sitting   Pulse: 89   Temp: 98.1 °F (36.7 °C)   TempSrc: Infrared   SpO2: 97%   Weight: 89.7 kg (197 lb 12.8 oz)   Height: 162.6 cm (64.02\")     Physical Exam  HENT:      Right Ear: A middle ear effusion is present.                    ASSESSMENT/ PLAN:    Diagnoses and all orders for this visit:    1. Non-recurrent acute suppurative otitis media of left ear with spontaneous rupture of tympanic membrane (Primary)  -     cefuroxime (CEFTIN) 500 MG tablet; Take 1 tablet by mouth 2 (Two) Times a Day.  Dispense: 20 tablet; Refill: 0  -     cefTRIAXone (ROCEPHIN) injection 1 g  -     traMADol (ULTRAM) 50 MG tablet; Take 1 tablet by mouth Every 6 (Six) Hours As Needed for Moderate Pain.  Dispense: 40 tablet; Refill: 0      Procedures     Management Plan:   Complete all antibiotics. Keep ENT appointment.  An After Visit Summary was printed and given to the patient at discharge.    Follow-up: Return if symptoms worsen or fail to improve.         Manpreet Kidd, SOLANGE 8/27/2024 16:02 CDT  This note was electronically signed.          "

## 2024-08-29 ENCOUNTER — TELEPHONE (OUTPATIENT)
Dept: FAMILY MEDICINE CLINIC | Facility: CLINIC | Age: 54
End: 2024-08-29
Payer: MEDICAID

## 2024-09-02 DIAGNOSIS — H66.012 NON-RECURRENT ACUTE SUPPURATIVE OTITIS MEDIA OF LEFT EAR WITH SPONTANEOUS RUPTURE OF TYMPANIC MEMBRANE: ICD-10-CM

## 2024-09-03 DIAGNOSIS — H66.012 NON-RECURRENT ACUTE SUPPURATIVE OTITIS MEDIA OF LEFT EAR WITH SPONTANEOUS RUPTURE OF TYMPANIC MEMBRANE: Primary | ICD-10-CM

## 2024-09-03 RX ORDER — CEFUROXIME AXETIL 500 MG/1
500 TABLET ORAL 2 TIMES DAILY
Qty: 20 TABLET | Refills: 0 | Status: SHIPPED | OUTPATIENT
Start: 2024-09-03 | End: 2024-09-03

## 2024-09-04 DIAGNOSIS — N95.1 MENOPAUSE SYNDROME: ICD-10-CM

## 2024-09-04 RX ORDER — ESTRADIOL 1 MG/1
1 TABLET ORAL DAILY
Qty: 90 TABLET | Refills: 0 | Status: SHIPPED | OUTPATIENT
Start: 2024-09-04

## 2024-09-04 NOTE — TELEPHONE ENCOUNTER
Rx Refill Note  Requested Prescriptions     Pending Prescriptions Disp Refills    estradiol (ESTRACE) 1 MG tablet [Pharmacy Med Name: Estradiol 1 MG Oral Tablet] 90 tablet 0     Sig: Take 1 tablet by mouth once daily      Last office visit with prescribing clinician: 8/27/2024       Pt is due for a pap smear.     Nita Zhou MA  09/04/24, 11:14 CDT

## 2024-09-05 DIAGNOSIS — Z86.69 HISTORY OF EAR INFECTIONS: Primary | ICD-10-CM

## 2024-09-06 ENCOUNTER — TELEPHONE (OUTPATIENT)
Dept: OTOLARYNGOLOGY | Facility: CLINIC | Age: 54
End: 2024-09-06
Payer: MEDICAID

## 2024-09-06 ENCOUNTER — HOSPITAL ENCOUNTER (EMERGENCY)
Facility: HOSPITAL | Age: 54
Discharge: HOME | End: 2024-09-06
Payer: MEDICAID

## 2024-09-06 VITALS
WEIGHT: 198 LBS | HEIGHT: 64 IN | RESPIRATION RATE: 18 BRPM | OXYGEN SATURATION: 97 % | DIASTOLIC BLOOD PRESSURE: 76 MMHG | TEMPERATURE: 98.9 F | HEART RATE: 80 BPM | SYSTOLIC BLOOD PRESSURE: 136 MMHG | BODY MASS INDEX: 33.8 KG/M2

## 2024-09-06 DIAGNOSIS — H66.90 ACUTE OTITIS MEDIA, UNSPECIFIED OTITIS MEDIA TYPE: Primary | ICD-10-CM

## 2024-09-06 PROCEDURE — 99282 EMERGENCY DEPT VISIT SF MDM: CPT

## 2024-09-06 RX ORDER — FLUTICASONE PROPIONATE 50 MCG
2 SPRAY, SUSPENSION (ML) NASAL DAILY
Qty: 16 G | Refills: 0 | Status: SHIPPED | OUTPATIENT
Start: 2024-09-06 | End: 2024-09-09 | Stop reason: SDUPTHER

## 2024-09-06 NOTE — TELEPHONE ENCOUNTER
Spoke with Angeline and scheduled patient with Efren on 9/9.   General: WN/WD NAD  Head: Atraumatic  Eyes: EOM grossly in tact, no scleral icterus  ENT: moist mucous membranes  Neurology: A&Ox3, nonfocal, SHRESTHA x 4  Respiratory: normal respiratory effort  CV: Extremities warm and well perfused  Abdominal: Soft, non-distended, mildly tender to palpation diffusely  Extremities: No edema  Skin: No rashes

## 2024-09-06 NOTE — ED PROVIDER NOTES
Subjective   History of Present Illness  Patient is a 53-year-old female presents emergency department with left ear pain for the past several months.  She is currently on Augmentin.  She has been on several antibiotics for her ear infection.  She states she cannot see ear nose and throat until October.  She states she has had a fungal infection to the ear previously.  She states today the pain is worse.  No drainage.    History provided by:  Patient   used: No        Review of Systems   Constitutional: Negative.    HENT:          Patient is a 53-year-old female presents emergency department with left ear pain for the past several months.  She is currently on Augmentin.  She has been on several antibiotics for her ear infection.  She states she cannot see ear nose and throat until October.  She states she has had a fungal infection to the ear previously.  She states today the pain is worse.  No drainage.     Eyes: Negative.    Respiratory: Negative.     Cardiovascular: Negative.    Gastrointestinal: Negative.    Endocrine: Negative.    Genitourinary: Negative.    Musculoskeletal: Negative.    Skin: Negative.    Allergic/Immunologic: Negative.    Neurological: Negative.    Hematological: Negative.    Psychiatric/Behavioral: Negative.     All other systems reviewed and are negative.      Past Medical History:   Diagnosis Date    ADHD (attention deficit hyperactivity disorder) 2023    Allergic     Anxiety     Arthritis     Depression     Diabetes mellitus     GERD (gastroesophageal reflux disease) 2017    HL (hearing loss)     Visual impairment        Allergies   Allergen Reactions    Hydrocodone Itching and Hives       Past Surgical History:   Procedure Laterality Date    EYE SURGERY  2003    Lasik    FRACTURE SURGERY  1974    Left thumb broken    HYSTERECTOMY      SPINE SURGERY      TONSILLECTOMY      TUBAL ABDOMINAL LIGATION         Family History   Problem Relation Age of Onset    Hearing loss  Mother     Hyperlipidemia Mother     Depression Father     Diabetes Father     Hearing loss Father     COPD Maternal Grandmother     Arthritis Paternal Grandmother     Hypertension Sister        Social History     Socioeconomic History    Marital status:    Tobacco Use    Smoking status: Former     Current packs/day: 0.00     Average packs/day: 0.5 packs/day for 20.0 years (10.0 ttl pk-yrs)     Types: Cigarettes     Start date: 5/15/1987     Quit date: 2007     Years since quittin.6     Passive exposure: Never    Smokeless tobacco: Never   Vaping Use    Vaping status: Never Used   Substance and Sexual Activity    Alcohol use: Not Currently    Drug use: Never    Sexual activity: Defer       Prior to Admission medications    Medication Sig Start Date End Date Taking? Authorizing Provider   amoxicillin-clavulanate (AUGMENTIN) 875-125 MG per tablet Take 1 tablet by mouth 2 (Two) Times a Day for 10 days. 9/3/24 9/13/24  Manpreet Kidd APRN   atorvastatin (Lipitor) 20 MG tablet Take 1 tablet by mouth Daily. 24   Manpreet Kidd APRN   Azelastine HCl 137 MCG/SPRAY solution USE 1 TO 2 SPRAY(S) IN EACH NOSTRIL TWICE DAILY AS NEEDED 24   Litzy Matta MD   cetirizine (zyrTEC) 10 MG tablet Take 1 tablet by mouth Daily. 24   Manpreet Kidd APRN   desonide (DESOWEN) 0.05 % cream USE 1-2 TIMES A DAY ON MILD TO RESOLVING LESIONS. MAY BE USED ON FACE, NECK, AND THIN SKIN 24   Litzy Matta MD   erythromycin (ROMYCIN) 5 MG/GM ophthalmic ointment APPLY A SMALL AMOUNT ON EYELID THREE TIMES A DAY AS DIRECTED. BEGIN AS DIRECTED AFTER SURGERY 24   Litzy Matta MD   estradiol (ESTRACE) 1 MG tablet Take 1 tablet by mouth once daily 24   Manpreet Kidd APRN   fenofibrate (Tricor) 145 MG tablet Take 1 tablet by mouth Daily. 3/21/24   Manpreet Kidd APRN   fexofenadine (ALLEGRA) 180 MG tablet Take 1 tablet by mouth Daily. 24   Litzy Matta MD  "  lisinopril (Zestril) 2.5 MG tablet Take 1 tablet by mouth Daily. 3/14/24   Manpreet Kidd APRN   loratadine (EQ Loratadine) 10 MG tablet Take 1 tablet by mouth once daily 8/2/24   Manpreet Kidd APRN   meclizine (ANTIVERT) 25 MG tablet Take 1 tablet by mouth 3 (Three) Times a Day As Needed for Dizziness. 7/8/24   Manpreet Kidd APRN   Melatonin 1 MG capsule sometimes    ProviderLitzy MD   metFORMIN ER (GLUCOPHAGE-XR) 500 MG 24 hr tablet Take 2 tablets by mouth 2 (Two) Times a Day. 7/8/24   Manpreet Kidd APRN   montelukast (Singulair) 10 MG tablet Take 1 tablet by mouth Every Night. 6/3/24   Manpreet Kidd APRN   omeprazole (priLOSEC) 20 MG capsule Take 1 capsule by mouth Daily. 3/21/24   Manpreet Kidd APRN   sertraline (ZOLOFT) 100 MG tablet Take 1 tablet by mouth every night at bedtime. 7/8/24   Manpreet Kidd APRN   traMADol (ULTRAM) 50 MG tablet Take 1 tablet by mouth Every 6 (Six) Hours As Needed for Moderate Pain. 8/27/24   Manpreet Kidd APRN   triamcinolone (KENALOG) 0.1 % cream USE 1-2 TIMES A DAY ON MODERATE TO SEVERE LESIONS. DO NOT USE ON FACE, NECK OR THIN SKIN. 8/21/24   ProviderLitzy MD   zolpidem (AMBIEN) 10 MG tablet TAKE 1 TABLET BY MOUTH AT NIGHT AS NEEDED FOR SLEEP 7/9/24   Manpreet Kidd APRN       /82   Pulse 91   Temp 98.9 °F (37.2 °C) (Oral)   Resp 18   Ht 162.6 cm (64.02\")   Wt 89.8 kg (198 lb)   SpO2 98%   BMI 33.97 kg/m²     Objective   Physical Exam  Vitals and nursing note reviewed.   Constitutional:       Appearance: She is well-developed.   HENT:      Head: Normocephalic.      Comments: Left tm eryth with clear fld noted no drainage. Rtm with clear fluid noted  Eyes:      Conjunctiva/sclera: Conjunctivae normal.      Pupils: Pupils are equal, round, and reactive to light.   Neck:      Thyroid: No thyromegaly.      Trachea: No tracheal deviation.   Cardiovascular:      Rate and Rhythm: Normal rate and regular rhythm.      Heart " sounds: Normal heart sounds.   Pulmonary:      Effort: Pulmonary effort is normal. No respiratory distress.      Breath sounds: Normal breath sounds. No wheezing or rales.   Chest:      Chest wall: No tenderness.   Abdominal:      General: Bowel sounds are normal.      Palpations: Abdomen is soft.   Musculoskeletal:         General: Normal range of motion.      Cervical back: Normal range of motion and neck supple. No rigidity.   Skin:     General: Skin is warm and dry.   Neurological:      Mental Status: She is alert and oriented to person, place, and time.      Cranial Nerves: No cranial nerve deficit.      Deep Tendon Reflexes: Reflexes are normal and symmetric.   Psychiatric:         Behavior: Behavior normal.         Thought Content: Thought content normal.         Judgment: Judgment normal.         Procedures         Lab Results (last 24 hours)       ** No results found for the last 24 hours. **            No orders to display       ED Course  ED Course as of 09/06/24 1521   Fri Sep 06, 2024   1443 Have placed to call ENT to see if can get pt in sooner than October. Will call back  [CW]   1514 Spoke with ENT. Will see in the office on Monday at 1:15 with Efren NARVAEZ. Advised pt of appointment. Advised to continue augmentin. Will prescribe flonase as well. Pt in agreement with care plan and voices understanding of instructions.  [CW]      ED Course User Index  [CW] Angeline Butcher APRN        Medical Decision Making  Patient is a 53-year-old female presents emergency department with left ear pain for the past several months.  She is currently on Augmentin.  She has been on several antibiotics for her ear infection.  She states she cannot see ear nose and throat until October.  She states she has had a fungal infection to the ear previously.  She states today the pain is worse.  No drainage.  Course of treatment in the er: non toxic appearing. No acute distress. Vitals stable tms with clear fld  bilat. Left tm sl eryth. No drainage noted. No perforation noted. O/p with clear pnd. Neck supple no adenopathy. Lung cta   Differential diagnosis to include but not limited to: tm perforation; otitis media; mastoiditis; allergic rhinitis   Spoke with ENT. Will see in the office on Monday at 1:15 with Efren NARVAEZ. Advised pt of appointment. Advised to continue augmentin. Will prescribe flonase as well. Pt in agreement with care plan and voices understanding of instructions.            Final diagnoses:   Acute otitis media, unspecified otitis media type          Angeline Butcher, APRN  09/06/24 1521

## 2024-09-06 NOTE — TELEPHONE ENCOUNTER
Provider: JAQUAN SEXTON    Caller: [facility]  [facility]    Relationship to Patient: PROVIDER    Reason for Call: PATIENT HAS AN APPOINTMENT IN OCTOBER WITH JAQUAN. SHE IS IN THE ER RIGHT NOW. WEI NARVAEZ IN ED CALLED ASKING TO SEE IF WE CAN GET HER IN SOONER. EAR INFECTION AND EAR PAIN. PLEASE CALL HER BACK ASAP BEST NUMBER [phone].     HUB AGENT UNABLE TO WARM TRANSFER PLEASE CALL BACK ASAP     .

## 2024-09-09 ENCOUNTER — OFFICE VISIT (OUTPATIENT)
Dept: OTOLARYNGOLOGY | Facility: CLINIC | Age: 54
End: 2024-09-09
Payer: MEDICAID

## 2024-09-09 VITALS
SYSTOLIC BLOOD PRESSURE: 134 MMHG | WEIGHT: 195.8 LBS | DIASTOLIC BLOOD PRESSURE: 92 MMHG | BODY MASS INDEX: 33.59 KG/M2 | TEMPERATURE: 98.2 F | HEART RATE: 100 BPM

## 2024-09-09 DIAGNOSIS — R42 VERTIGO: ICD-10-CM

## 2024-09-09 DIAGNOSIS — H93.8X3 EAR PRESSURE, BILATERAL: ICD-10-CM

## 2024-09-09 DIAGNOSIS — L92.9 GRANULATION TISSUE: ICD-10-CM

## 2024-09-09 DIAGNOSIS — H91.93 DECREASED HEARING OF BOTH EARS: ICD-10-CM

## 2024-09-09 DIAGNOSIS — H93.13 TINNITUS OF BOTH EARS: ICD-10-CM

## 2024-09-09 DIAGNOSIS — H92.03 EAR PAIN, BILATERAL: Primary | ICD-10-CM

## 2024-09-09 PROCEDURE — 92504 EAR MICROSCOPY EXAMINATION: CPT | Performed by: NURSE PRACTITIONER

## 2024-09-09 PROCEDURE — 1159F MED LIST DOCD IN RCRD: CPT | Performed by: NURSE PRACTITIONER

## 2024-09-09 PROCEDURE — 1160F RVW MEDS BY RX/DR IN RCRD: CPT | Performed by: NURSE PRACTITIONER

## 2024-09-09 PROCEDURE — 99214 OFFICE O/P EST MOD 30 MIN: CPT | Performed by: NURSE PRACTITIONER

## 2024-09-09 RX ORDER — AZELASTINE 1 MG/ML
2 SPRAY, METERED NASAL 2 TIMES DAILY
Qty: 30 ML | Refills: 2 | Status: SHIPPED | OUTPATIENT
Start: 2024-09-09

## 2024-09-09 RX ORDER — FLUTICASONE PROPIONATE 50 MCG
2 SPRAY, SUSPENSION (ML) NASAL DAILY
Qty: 16 G | Refills: 2 | Status: SHIPPED | OUTPATIENT
Start: 2024-09-09

## 2024-09-09 NOTE — PATIENT INSTRUCTIONS
TINNITUS  Tinnitus (latin for ringing) is the sensation of noise in the ears. This symptom can be quite variable and disconcerting. Most people have had ringing in the ears but most do not require treatment. Only 6% of people have ringing troubling enough to seek treatment.    Symptoms can range from ringing to “noise” of any sort in the ear or ears. Timing can vary as well. There are no specific symptom requirements to have tinnitus. It is speculated that the inner ear hair cells (responsible for hearing) may be dying and causing the brain to seek additional input for sound that is missing. There are many theories for the etiology of tinnitus or ringing.    You may be referred for hearing testing or imaging of the ears/brain to try to determine what is causing ringing and how to best treat the condition.    Tinnitus Recommendations-  >Avoid loud or sudden noise  >Wear hearing protection in the presence of loud noise  >Avoid irritants like caffeine, nicotine, tonic water, malaria medications, alcohol, aspirin- please tell MD if you are taking any of these medications  >In a quiet environment or while sleeping, use a white noise generator or radio station to provide background noise  >Relaxation and stress management techniques are useful  >Biofeedback  >Complementary medicine may be of benefit  >Wear a hearing aid or tinnitus masker/retrainer  >Psychological counseling may be beneficial in some situations  >Exercise!!  >Restorative Sleep!!    Medical therapy for ringing (may include)-  Do nothing  Medications for ringing  IV medication  Ear perfusion- inner ear surgery to reduce ringing  Hearing Aids, Tinnitus maskers, Tinnitus retrainers  Biofeedback  Psychological counseling    All options will be reviewed at your visit. Treating tinnitus can be difficult and frustrating. There really is no cure but rather control. This can be time consuming to treat. The patient determines how much treatment is warranted if there  "are no associated medical conditions requiring therapy. Please be patient.     VESTIBULAR EXERCISES:    Goals:  >To develop proprioceptive and visual mechanisms to compensate for a disturbance in balance function (labyrinthine system)  >To improve muscle coordination in general  T>o practice balancing under everyday conditions with special attention to using the eyes,  muscle and joint senses  >To train the movement of the eyes independent of the head  >To loosen the muscles of the neck and shoulder to overcome the protective muscular spasm and tendency to move \"in one piece\"  >To practice head movements that cause dizziness and thus gradually overcome the disability  >To encourage the restoration of self-confidence and easy spontaneous motion     Exercise Program:  A. Eye exercises (while seated in bed). Perform 5 to 10 minutes at least 5 times each day; first do slowly, then quickly. Perform 20 times each.  1.     Up and down  2.     Side to side  3.     Diagonal  4.     Focus on finger moving from 3 feet to one foot from face    B. Head exercises. To be done at first slowly with eyes open in primary position, then quickly. Later do with eyes closed. Perform 20 times each.  1.     Bending forward and backward  2.     Turning from side to side  3.     Tilting from side to side  4.     Diagonal movements    C. Coordinate the movement of head and eyes in the same direction as in B.    D. Shoulder shrugging and circling. Perform 20 times each.    E. While seated, bend forward and  objects from the ground. Perform 20 times.    F.  Standing exercises. Perform 20 times each.  1. Repeat section A  2. Change from a sitting to a standing position with the eyes open and shut.  3. Throw ball from hand to hand, above eye level             4. Throw ball from hand to hand, under knee  5. Change form from sitting to standing, turning around in between     Full activity: Perform 10 times each.  1. Walk across room with eyes " open, then closed  2. Walk up and down a slope with eyes open, then closed  3. Walk up and down steps with eyes open, then closed  4. Sit up and lie down in bed  5. Stand up and sit down in a chair  6. Recover balance when pushed in any direction  7. Throw and catch a ball  8. Any game involving stooping, straining and aiming     The following are of value in rehabilitating patients with balance problems:  1. A light cane may be used, not for walking, but to provide additional proprioceptive            orienting input.  2. While walking, the patient should not look down, but rather select a distant point for          fixation.  3. Night-lights should be left on in the patient’s home.  4. In-patients with cervical spine problems, especially in those that head turning        increase unsteadiness, the use of soft foam cervical collar limits motion and improves         stability. Exercises should be modified accordingly.  5. Mild central stimulants (Ritalin, Caffeine) are useful in alerting the patient to respond       quickly to orienting input.  6. Optimal visual correction should be achieved and maintained. If cataract surgery is           being performed, then contact lens are recommended to avoid optical distortion.  7.  Extreme fatigue and stress is to be avoided, as this may worsen the dizziness.  8.  Sedatives, vestibular suppressants and tranquilizers (Valium, Phenergan, Antivert, Dramamine, Klonopin, etc.) are to be avoided, as this may slow compensation by the brain and cause drowsiness.

## 2024-09-09 NOTE — PROGRESS NOTES
SOLANGE Ken  W ENT [facility]  [address], [address]  [place] [address]  Fax [phone]  Phone [phone]      Visit Type: NEW PATIENT   Chief Complaint   Patient presents with    Ear Problem     Fluid in ear, has had 5 ear infections this year    Dizziness     Off balance and when she lays down, started feeling dizzy about the time she started getting ear infections           Anel Durbin is a 54 y.o. female who presents for evaluation of ear complaints. Symptoms have been present for months. Symptoms present in ear bilaterally but left worse than right. Symptoms include ear pain, fullness and pressure, buzzing constant tinnitus, intermittent muffled hearing in left ear and dizziness described as right cazares spinning sensation.   Denies ear drainage.   Admits dizziness is triggered by rapid position changes and laying back in bed. Does have some randomly onset of imbalance occasionally with walking.     States she has seen PCP multiple times for this in the past several months, treated for ear infections with multiple rounds of antibiotics now, no steroid use.   She has been using flonase daily for past 2-3 years.   Has tried Claritin, zyrtec, and Astelin intermittently but did not seem to to help.   States she also washes her ears out with water and uses q-tips fairly regularly.       Past Medical History:   Diagnosis Date    ADHD (attention deficit hyperactivity disorder) 2023    Allergic     Anxiety     Arthritis     Depression     Diabetes mellitus     GERD (gastroesophageal reflux disease) 2017    HL (hearing loss)     Visual impairment        Past Surgical History:   Procedure Laterality Date    EYE SURGERY  2003    Lasik    FRACTURE SURGERY  1974    Left thumb broken    HYSTERECTOMY      SPINE SURGERY      TONSILLECTOMY      TUBAL ABDOMINAL LIGATION         Family History: Her family history  includes Arthritis in her paternal grandmother; COPD in her maternal grandmother; Depression in her father; Diabetes in her father; Hearing loss in her father and mother; Hyperlipidemia in her mother; Hypertension in her sister.     Social History: She  reports that she quit smoking about 17 years ago. Her smoking use included cigarettes. She started smoking about 37 years ago. She has a 10 pack-year smoking history. She has never been exposed to tobacco smoke. She has never used smokeless tobacco. She reports that she does not currently use alcohol. She reports that she does not use drugs.    Home Medications:  Melatonin, amoxicillin-clavulanate, atorvastatin, azelastine, cetirizine, desonide, erythromycin, estradiol, fenofibrate, fexofenadine, fluticasone, lisinopril, loratadine, meclizine, metFORMIN ER, montelukast, omeprazole, sertraline, traMADol, triamcinolone, and zolpidem    Allergies:  She is allergic to hydrocodone.       Vital Signs:   Temp:  [98.2 °F (36.8 °C)] 98.2 °F (36.8 °C)  Heart Rate:  [100] 100  BP: (134)/(92) 134/92  ENT Physical Exam  Constitutional  Appearance: patient appears well-developed, well-nourished and well-groomed,  Communication/Voice: communication appropriate for developmental age; vocal quality normal;  Head and Face  Appearance: head appears normal, face appears normal and face appears atraumatic;  Palpation: facial palpation normal;  Salivary: glands normal;  Ear  Hearing: intact;  Auricles: bilateral auricles normal;  External Mastoids: bilateral external mastoids normal;  Ear Canals: bilateral ear canals normal;  Tympanic Membranes: left tympanic membrane abnormal; with granulation tissue; Tympanic Membrane comments: Cerumen/debris present to inferior portion of TM, removed during exam see procedure note.  Postremoval is notable erythema and granulation tissue to central and superior portion of TM.   Nose  External Nose: nares patent bilaterally; external nose  normal;  Internal Nose: nasal mucosa normal; bilateral inferior turbinates normal;  Oral Cavity/Oropharynx  Lips: normal;  Teeth: normal;  Gums: gingiva normal;  Tongue: normal;  Oral mucosa: normal;  Hard palate: normal;  Soft palate: normal;  Tonsils: normal;  Neck  Neck: neck normal;  Respiratory  Inspection: breathing unlabored; normal breathing rate;  Cardiovascular  Inspection: extremities are warm and well perfused;  Neurovestibular  Mental Status: alert and oriented;       Ear Microscopy    Date/Time: 9/9/2024 1:55 PM    Performed by: Efren Martinez APRN  Authorized by: Efren Martinez APRN    Ear microscopy was indicated due to examination of tympanic membrane.     Consent was given by the patient. The risks of the procedure were discussed including but not limited to bleeding, nerve damage, hearing loss/ tinnitus, tympanic membrane perforation, infection, otorrhea, vertigo, pain, scarring and aural fullness. Alternatives were discussed, including no treatment, delayed treatment, observation and alternative treatments. After discussion of the risks and benefits, questions were allowed and answered until understanding was demonstrated. Consent to perform the procedure was obtained through verbal consent consent.     Ear examination was performed utilizing binocular microscopy.  Right auricle:   normal:   Right ear canal:   normal:   Right tympanic membrane:   normal:   Left auricle:   normal:   Left ear canal:   normal:   Left tympanic membrane:   erythematous, granulation present, Cerumen/debris present to the inferior portion of TM.     Post-procedure details:     Inspection after the procedure revealed an intact TM (Granulation tissue central superior TM).    Gentian Violet and ACHS powder was applied to the ear canal.    The procedure was tolerated well, no immediate complications.         Result Review       RESULTS REVIEW    I have reviewed the patients old records in the chart.           Assessment &  Plan  Ear pain, bilateral    Tinnitus of both ears    Vertigo    Decreased hearing of both ears    Ear pressure, bilateral    Granulation tissue       Orders Placed This Encounter   Procedures    $ Binocular Microscopy    Tympanometry      New Medications Ordered This Visit   Medications    azelastine (ASTELIN) 0.1 % nasal spray     Si sprays into the nostril(s) as directed by provider 2 (Two) Times a Day. Use in each nostril as directed     Dispense:  30 mL     Refill:  2    fluticasone (FLONASE) 50 MCG/ACT nasal spray     Si sprays into the nostril(s) as directed by provider Daily.     Dispense:  16 g     Refill:  2     Tympanometry obtained, type a bilaterally.    Patient appears to have granulation tissue to left TM, there is some thicker cerumen/debris present to inferior TM that was removed during exam, no notable perforation.  Right TM appears relatively normal at this time.  TM violet and ACHS powder was applied, will attempt to resolve some granulation tissue and follow closely with recheck in about 1 to 2 weeks, we will forego audio and vestibular testing until recheck for the granulation to see if this improves if we are able to resolve granulation tissue.  I did advise basic home conservative measures including Flonase and Astelin and home tinnitus recommendations and vestibular exercises for symptom management.      Call for ear problems, especially change of hearing, ear pain or dizziness.  For the best results, use nasal sprays every day. It may take a week to build up in the nasal lining and a few more weeks to improve the eustachian tube function.  Protect getting water in the ears. If needed, may use over the counter silicone plugs or a cotton ball coated with vasoline when bathing.  Use hairdryer on a cool setting after bathing.  We will follow perforation conservatively. If not closing or if symptoms develop, will consider operative closure in the future.  Use hearing protection in loud  noise situations.  Use home masking techniques- use low sound level of a pleasant sound like a fan or radio at night to drown out the tinnitus sound. If not working, can consider formal masking device through our hearing aid department.  Dry ear precautions  Flonase  Start Astelin  Saline spray  Home tinnitus recommendations  Home vestibular exercises  Increase water intake, decrease caffeine and stimulants  Low-salt diet    Possible TMJ component considered also, no notable tmj tendernses at this time but does report she frequently clenches jaw and wakes up with soreness often, recently had an episode to the point she had difficulty opening mouth, unsure if this was at the same time as ear pain or not. May explore this more pending progression and work up.     Call with questions or concerns    Return for 1-2 weeks, recheck ears.        Electronically signed by SOLANGE Ken 09/09/24 4:11 PM CDT.

## 2024-09-11 DIAGNOSIS — F41.9 ANXIETY: Primary | ICD-10-CM

## 2024-09-11 RX ORDER — BUSPIRONE HYDROCHLORIDE 10 MG/1
10 TABLET ORAL 3 TIMES DAILY PRN
Qty: 90 TABLET | Refills: 2 | Status: SHIPPED | OUTPATIENT
Start: 2024-09-11

## 2024-09-17 ENCOUNTER — OFFICE VISIT (OUTPATIENT)
Dept: OTOLARYNGOLOGY | Facility: CLINIC | Age: 54
End: 2024-09-17
Payer: MEDICAID

## 2024-09-17 VITALS
DIASTOLIC BLOOD PRESSURE: 77 MMHG | HEART RATE: 101 BPM | TEMPERATURE: 98.6 F | HEIGHT: 64 IN | SYSTOLIC BLOOD PRESSURE: 121 MMHG | WEIGHT: 195 LBS | BODY MASS INDEX: 33.29 KG/M2

## 2024-09-17 DIAGNOSIS — H91.93 DECREASED HEARING OF BOTH EARS: ICD-10-CM

## 2024-09-17 DIAGNOSIS — L92.9 GRANULATION TISSUE: ICD-10-CM

## 2024-09-17 DIAGNOSIS — R42 VERTIGO: ICD-10-CM

## 2024-09-17 DIAGNOSIS — H93.13 TINNITUS OF BOTH EARS: ICD-10-CM

## 2024-09-17 DIAGNOSIS — H93.8X3 EAR PRESSURE, BILATERAL: ICD-10-CM

## 2024-09-17 DIAGNOSIS — H92.03 EAR PAIN, BILATERAL: Primary | ICD-10-CM

## 2024-09-17 RX ORDER — PREDNISOLONE ACETATE 10 MG/ML
SUSPENSION/ DROPS OPHTHALMIC
Qty: 10 ML | Refills: 0 | Status: SHIPPED | OUTPATIENT
Start: 2024-09-17

## 2024-09-24 ENCOUNTER — TELEPHONE (OUTPATIENT)
Dept: FAMILY MEDICINE CLINIC | Facility: CLINIC | Age: 54
End: 2024-09-24
Payer: MEDICAID

## 2024-09-30 ENCOUNTER — OFFICE VISIT (OUTPATIENT)
Dept: OTOLARYNGOLOGY | Facility: CLINIC | Age: 54
End: 2024-09-30
Payer: MEDICAID

## 2024-09-30 VITALS
TEMPERATURE: 98.6 F | BODY MASS INDEX: 33.43 KG/M2 | SYSTOLIC BLOOD PRESSURE: 147 MMHG | HEIGHT: 64 IN | WEIGHT: 195.8 LBS | HEART RATE: 99 BPM | DIASTOLIC BLOOD PRESSURE: 90 MMHG

## 2024-09-30 DIAGNOSIS — R42 VERTIGO: ICD-10-CM

## 2024-09-30 DIAGNOSIS — H93.8X3 EAR PRESSURE, BILATERAL: ICD-10-CM

## 2024-09-30 DIAGNOSIS — H91.93 DECREASED HEARING OF BOTH EARS: ICD-10-CM

## 2024-09-30 DIAGNOSIS — H93.13 TINNITUS OF BOTH EARS: ICD-10-CM

## 2024-09-30 DIAGNOSIS — H92.03 EAR PAIN, BILATERAL: Primary | ICD-10-CM

## 2024-09-30 DIAGNOSIS — L92.9 GRANULATION TISSUE: ICD-10-CM

## 2024-09-30 PROCEDURE — 92504 EAR MICROSCOPY EXAMINATION: CPT | Performed by: NURSE PRACTITIONER

## 2024-09-30 PROCEDURE — 99213 OFFICE O/P EST LOW 20 MIN: CPT | Performed by: NURSE PRACTITIONER

## 2024-09-30 NOTE — PATIENT INSTRUCTIONS
>NASAL STEROID use:  Using nasal steroids:  You will be prescribed one of the following nasal steroids: Flonase, Nasacort, Nasonex, Rhinocort, Qnasl, Zetonna  2 puffs each nostril 2 times daily  Start as soon as possible  If you are using Afrin for 3 days with the nasal steroid,  Use Afrin first and wait 10 minutes to allow the nose to open. Then administer nasal steroids.   >NASAL SALINE:  Use 2 puffs each nostril 4-6 times daily and more frequently if possible.  You can buy saline spray or you can make your own and use an old spray bottle to administer  Use a humidifier at bedside  Recipe for saline:  Water                                 1 quart  Salt (table)                        1 tablespoon  Gylcerin (or Adriana Syrup)    1 teaspoon  Sodium bicarbonate           1 teaspoon  Sprays or Reanna pots are recommended    Do not allow to stand for more than 24 hrs. Make new solution. There is no preservative in this solution.    Sinus irrigation and saline application can be enhanced with various over-the-counter products.  A WaterPik can be quite useful to irrigate, especially following sinus surgery.  Navage makes a product that irrigates the nose and some of the sinuses.  NeilMed makes a Sinu-Gator to irrigate the sinuses.  Neomed also has canned saline that we will come out under pressure.  A Reanna pot can also be helpful.  All of these products help keep the nose clear of debris.  Please use as directed on the instructions that come with the particular device.   How to pop ears:  Pop your ears by holding nose and closing mouth, then blow hard until ears pop  Children (less than 8-9 years)- blow up ballons 4 times a day and more frequently

## 2024-09-30 NOTE — PROGRESS NOTES
SOLANGE Ken  [facility]AR [facility] & TH[facility]  [address], [address]  [place] [address]  Fax [phone]  Phone [phone]      Visit Type: FOLLOW UP   Chief Complaint   Patient presents with    Ear Problem     recheck ear-ear room, granulation polyp           HPI  Ghislaine Peyton Durbin is a 54 y.o. female who presents for follow-up evaluation, recheck ears.Symptoms have been present for months. Symptoms present in ear bilaterally but left worse than right. Symptoms include ear pain, fullness and pressure, buzzing constant tinnitus, intermittent muffled hearing in left ear and dizziness described as right cazares spinning sensation.   Denies ear drainage.   Admits dizziness is triggered by rapid position changes and laying back in bed. Does have some randomly onset of imbalance occasionally with walking.     Patient has been found to have persistent granulation tissue to the left ear.  She has been placed on Pred forte trial since last visit no relief.  Since then she reports no drainage, significant pain or new symptoms. She feels like her tinnitus may be a little worse.    Past Medical History:   Diagnosis Date    ADHD (attention deficit hyperactivity disorder) 2023    Allergic     Anxiety     Arthritis     Depression     Diabetes mellitus     Dizziness     GERD (gastroesophageal reflux disease) 2017    HL (hearing loss)     Tinnitus     Visual impairment        Past Surgical History:   Procedure Laterality Date    EYE SURGERY  2003    Lasik    FRACTURE SURGERY  1974    Left thumb broken    HYSTERECTOMY      SPINE SURGERY      TONSILLECTOMY      TUBAL ABDOMINAL LIGATION         Family History: Her family history includes Arthritis in her paternal grandmother; COPD in her maternal grandmother; Depression in her father; Diabetes in her father; Hearing loss in her father and mother; Hyperlipidemia in her mother; Hypertension in her mother and  sister.     Social History: She  reports that she quit smoking about 17 years ago. Her smoking use included cigarettes. She started smoking about 37 years ago. She has a 10 pack-year smoking history. She has never been exposed to tobacco smoke. She has never used smokeless tobacco. She reports that she does not currently use alcohol. She reports that she does not use drugs.    Home Medications:  Melatonin, atorvastatin, azelastine, busPIRone, cetirizine, desonide, erythromycin, estradiol, fenofibrate, fexofenadine, fluticasone, lisinopril, loratadine, meclizine, metFORMIN ER, montelukast, omeprazole, prednisoLONE acetate, sertraline, traMADol, triamcinolone, and zolpidem    Allergies:  She is allergic to hydrocodone.       Vital Signs:   Temp:  [98.6 °F (37 °C)] 98.6 °F (37 °C)  Heart Rate:  [99] 99  BP: (147)/(90) 147/90  ENT Physical Exam  Constitutional  Appearance: patient appears well-developed, well-nourished and well-groomed,  Communication/Voice: communication appropriate for developmental age; vocal quality normal;  Head and Face  Appearance: head appears normal, face appears normal and face appears atraumatic;  Palpation: facial palpation normal;  Salivary: glands normal;  Ear  Hearing: intact;  Auricles: bilateral auricles normal;  External Mastoids: bilateral external mastoids normal;  Ear Canals: bilateral ear canals normal;  Tympanic Membranes: left tympanic membrane abnormal; with granulation tissue; Tympanic Membrane comments: Granulation to central left TM, improving   Nose  External Nose: nares patent bilaterally; external nose normal;  Internal Nose: nasal mucosa normal; bilateral inferior turbinates normal;  Oral Cavity/Oropharynx  Lips: normal;  Teeth: normal;  Gums: gingiva normal;  Tongue: normal;  Oral mucosa: normal;  Hard palate: normal;  Soft palate: normal;  Tonsils: normal;  Neck  Neck: neck normal;  Respiratory  Inspection: breathing unlabored; normal breathing  rate;  Cardiovascular  Inspection: extremities are warm and well perfused;  Neurovestibular  Mental Status: alert and oriented;       Ear Microscopy    Date/Time: 9/30/2024 9:39 AM    Performed by: Efren Martinez APRN  Authorized by: Efren Martinez APRN    Ear microscopy was indicated due to examination of tympanic membrane.     Consent was given by the patient. The risks of the procedure were discussed including but not limited to bleeding, nerve damage, hearing loss/ tinnitus, tympanic membrane perforation, infection, otorrhea, vertigo, scarring, pain and aural fullness. Alternatives were discussed, including no treatment, delayed treatment, observation and alternative treatments. After discussion of the risks and benefits, questions were allowed and answered until understanding was demonstrated. Consent to perform the procedure was obtained through verbal consent consent.     Ear examination was performed utilizing binocular microscopy.  Left auricle:   normal:   Left ear canal:   normal:   Left tympanic membrane:   granulation present.     Post-procedure details:     Inspection after the procedure revealed an intact TM.    No medication was applied to the ear canal.    The procedure was tolerated well, no immediate complications.  Comments:      No notable cerumen or debris to canal on TM to be removed today.  Granulation to central TM appears to be improving.     Result Review       RESULTS REVIEW    I have reviewed the patients old records in the chart.           Assessment & Plan  Ear pain, bilateral    Tinnitus of both ears    Vertigo    Decreased hearing of both ears    Ear pressure, bilateral    Granulation tissue       Orders Placed This Encounter   Procedures    $ Binocular Microscopy           Granulation appears to be improving to the left TM with Pred forte.  No significant debris or cerumen present to canal today.  Treatment options discussed and including continued conservative measures versus CT  scan.  Patient is somewhat anxious about condition and wishes to go ahead with CT scan.  Will go ahead with CT scan and advise continuing Pred forte for another 10 days.  Will plan to follow-up after CT scan for recheck again.  Advised her to call with any questions or concerns or new symptom development.  Due to reports of tinnitus and decreased hearing still we will pursue a full audio at follow-up as well.      Medical and surgical options were discussed including observation, continued medical management, medication modification, surgical management, and CT scanning. Risks, benefits and alternatives were discussed and questions were answered. After considering the options, the patient decided to proceed with CT scanning.  For the best results, use nasal sprays every day. It may take a week to build up in the nasal lining and a few more weeks to improve the eustachian tube function.  Protect getting water in the ears. If needed, may use over the counter silicone plugs or a cotton ball coated with vasoline when bathing.  Use hairdryer on a cool setting after bathing.  Use hearing protection in loud noise situations.  Continue Pred forte for 7 days  Continue water precautions  Flonase  Saline spray  Pop ears  Audio at follow-up    Call question concerns    Return for Recheck after CT, with Audio.        Electronically signed by SOLANGE Ken 09/30/24 9:39 AM CDT.

## 2024-10-03 RX ORDER — PREDNISOLONE ACETATE 10 MG/ML
SUSPENSION/ DROPS OPHTHALMIC
Qty: 10 ML | Refills: 0 | Status: SHIPPED | OUTPATIENT
Start: 2024-10-03

## 2024-10-07 ENCOUNTER — DOCUMENTATION (OUTPATIENT)
Dept: OTOLARYNGOLOGY | Facility: CLINIC | Age: 54
End: 2024-10-07
Payer: MEDICAID

## 2024-10-07 DIAGNOSIS — R42 VERTIGO: ICD-10-CM

## 2024-10-07 DIAGNOSIS — H93.8X3 EAR PRESSURE, BILATERAL: ICD-10-CM

## 2024-10-07 DIAGNOSIS — H92.03 EAR PAIN, BILATERAL: Primary | ICD-10-CM

## 2024-10-07 DIAGNOSIS — L92.9 GRANULATION TISSUE: ICD-10-CM

## 2024-10-07 DIAGNOSIS — H93.13 TINNITUS OF BOTH EARS: ICD-10-CM

## 2024-10-07 RX ORDER — FEXOFENADINE HCL 180 MG/1
180 TABLET ORAL DAILY
Qty: 90 TABLET | Refills: 0 | Status: SHIPPED | OUTPATIENT
Start: 2024-10-07

## 2024-10-07 NOTE — TELEPHONE ENCOUNTER
Rx Refill Note  Requested Prescriptions     Pending Prescriptions Disp Refills    fexofenadine (ALLEGRA) 180 MG tablet [Pharmacy Med Name: Fexofenadine HCl 180 MG Oral Tablet] 90 tablet 0     Sig: Take 1 tablet by mouth once daily        Allyssa Vaca MA  10/07/24, 15:30 CDT

## 2024-10-17 ENCOUNTER — HOSPITAL ENCOUNTER (OUTPATIENT)
Dept: CT IMAGING | Facility: HOSPITAL | Age: 54
Discharge: HOME | End: 2024-10-17
Admitting: NURSE PRACTITIONER
Payer: MEDICAID

## 2024-10-17 LAB — CREAT BLDA-MCNC: 0.8 MG/DL (ref 0.6–1.3)

## 2024-10-17 PROCEDURE — 82565 ASSAY OF CREATININE: CPT

## 2024-10-17 PROCEDURE — 70482 CT ORBIT/EAR/FOSSA W/O&W/DYE: CPT

## 2024-10-17 PROCEDURE — 25510000001 IOPAMIDOL 61 % SOLUTION: Performed by: NURSE PRACTITIONER

## 2024-10-17 RX ORDER — IOPAMIDOL 612 MG/ML
100 INJECTION, SOLUTION INTRAVASCULAR
Status: COMPLETED | OUTPATIENT
Start: 2024-10-17 | End: 2024-10-17

## 2024-10-17 RX ADMIN — IOPAMIDOL 100 ML: 612 INJECTION, SOLUTION INTRAVENOUS at 14:19

## 2024-10-21 ENCOUNTER — TELEPHONE (OUTPATIENT)
Dept: OTOLARYNGOLOGY | Facility: CLINIC | Age: 54
End: 2024-10-21
Payer: MEDICAID

## 2024-10-21 NOTE — TELEPHONE ENCOUNTER
----- Message from Efren Martinez sent at 10/18/2024 12:23 PM CDT -----  Unremarkable CT of the temporal bones no concerning findings, inner ear structures are unremarkable per radiologist.  Advised to continue with current treatment plan, schedule follow-up for recheck in about 3 to 4 weeks.  Call with questions or concerns or develop new symptoms.

## 2024-10-22 ENCOUNTER — TELEPHONE (OUTPATIENT)
Dept: OTOLARYNGOLOGY | Facility: CLINIC | Age: 54
End: 2024-10-22
Payer: MEDICAID

## 2024-10-22 NOTE — TELEPHONE ENCOUNTER
"Left VM with results of CT temporal bones per Efren Martinez \"Unremarkable CT of the temporal bones no concerning findings, inner ear structures are unremarkable per radiologist.  Advised to continue with current treatment plan, schedule follow-up for recheck in about 3 to 4 weeks.  Call with questions or concerns or develop new symptoms. \" Follow up scheduled with Efren Martinez on 11-25-24 at 9:45. Call office with any questions  "

## 2024-11-05 ENCOUNTER — TELEPHONE (OUTPATIENT)
Dept: OTOLARYNGOLOGY | Facility: CLINIC | Age: 54
End: 2024-11-05
Payer: MEDICAID

## 2024-11-05 ENCOUNTER — OFFICE VISIT (OUTPATIENT)
Dept: OTOLARYNGOLOGY | Facility: CLINIC | Age: 54
End: 2024-11-05
Payer: MEDICAID

## 2024-11-05 VITALS
SYSTOLIC BLOOD PRESSURE: 157 MMHG | BODY MASS INDEX: 33.59 KG/M2 | DIASTOLIC BLOOD PRESSURE: 83 MMHG | HEART RATE: 95 BPM | TEMPERATURE: 98.6 F | HEIGHT: 64 IN

## 2024-11-05 DIAGNOSIS — H93.8X1 EAR PRESSURE, RIGHT: ICD-10-CM

## 2024-11-05 DIAGNOSIS — H93.13 TINNITUS OF BOTH EARS: ICD-10-CM

## 2024-11-05 DIAGNOSIS — H92.03 EAR PAIN, BILATERAL: Primary | ICD-10-CM

## 2024-11-05 DIAGNOSIS — L92.9 GRANULATION TISSUE: ICD-10-CM

## 2024-11-05 DIAGNOSIS — R42 VERTIGO: ICD-10-CM

## 2024-11-05 RX ORDER — AZELASTINE 1 MG/ML
2 SPRAY, METERED NASAL 2 TIMES DAILY
Qty: 30 ML | Refills: 2 | Status: SHIPPED | OUTPATIENT
Start: 2024-11-05

## 2024-11-05 RX ORDER — FLUTICASONE PROPIONATE 50 MCG
2 SPRAY, SUSPENSION (ML) NASAL DAILY
Qty: 16 G | Refills: 2 | Status: SHIPPED | OUTPATIENT
Start: 2024-11-05

## 2024-11-05 NOTE — TELEPHONE ENCOUNTER
"Patient called and stated \"My right ear now has the shooting pains, loud buzzing, stopped up/full feeling and black stuff. I am trying to get an earlier appt and would have to be worked into schedule before 11/25 \" Mt appt this afternoon at 2:15. She VU  "

## 2024-11-05 NOTE — PROGRESS NOTES
SOLANGE Ken  [facility] EAR NOSE & THROAT  [address], [address]  [place] [address]  Fax [phone]  Phone [phone]      Visit Type: FOLLOW UP   Chief Complaint   Patient presents with    Ear Problem     discuss CT buzzing and drainage in right ear now           HISTORY OBTAINED FROM: patient  BRIAN Durbin is a 54 y.o. female who presents for follow up, recheck ear complaints.    Since last visit she reports she feels she has had good improvement in left ear symptoms but has recently started having right ear complaints. She admits right ear pain, fullness, buzzing sound and decreased hearing. Has been present for about week now.     Past Medical History:   Diagnosis Date    ADHD (attention deficit hyperactivity disorder) 2023    Allergic     Anxiety     Arthritis     Depression     Diabetes mellitus     Dizziness     GERD (gastroesophageal reflux disease) 2017    HL (hearing loss)     Tinnitus     Visual impairment        Past Surgical History:   Procedure Laterality Date    EYE SURGERY  2003    Lasik    FRACTURE SURGERY  1974    Left thumb broken    HYSTERECTOMY      SPINE SURGERY      TONSILLECTOMY      TUBAL ABDOMINAL LIGATION         Family History: Her family history includes Arthritis in her paternal grandmother; COPD in her maternal grandmother; Depression in her father; Diabetes in her father; Hearing loss in her father and mother; Hyperlipidemia in her mother; Hypertension in her mother and sister.     Social History: She  reports that she quit smoking about 17 years ago. Her smoking use included cigarettes. She started smoking about 37 years ago. She has a 10 pack-year smoking history. She has never been exposed to tobacco smoke. She has never used smokeless tobacco. She reports that she does not currently use alcohol. She reports that she does not use drugs.    Home Medications:  Melatonin, atorvastatin,  azelastine, busPIRone, cetirizine, desonide, erythromycin, estradiol, fenofibrate, fexofenadine, fluticasone, lisinopril, loratadine, meclizine, metFORMIN ER, montelukast, omeprazole, prednisoLONE acetate, sertraline, traMADol, triamcinolone, and zolpidem    Allergies:  She is allergic to hydrocodone.       Vital Signs:   Temp:  [98.6 °F (37 °C)] 98.6 °F (37 °C)  Heart Rate:  [95] 95  BP: (157)/(83) 157/83  ENT Physical Exam  Constitutional  Appearance: patient appears well-developed, well-nourished and well-groomed,  Communication/Voice: communication appropriate for developmental age; vocal quality normal;  Head and Face  Appearance: head appears normal, face appears normal and face appears atraumatic;  Palpation: facial palpation normal;  Salivary: glands normal;  Ear  Hearing: intact;  Auricles: bilateral auricles normal;  External Mastoids: bilateral external mastoids normal;  Ear Canals: bilateral ear canals normal; Ear Canal comments: Mild to moderate wet cerumen and debris buildup to anterior sulcus and against TM, removed during exam, see procedure note.  Tympanic Membranes: right tympanic membrane retracted; retracted pars tensa left tympanic membrane normal; Tympanic Membrane comments: Granulation to the left TM appears resolved.  Right TM with very mild retraction, good improvement with Valsalva   Nose  External Nose: nares patent bilaterally; external nose normal;  Internal Nose: nasal mucosa normal; bilateral inferior turbinates normal;  Oral Cavity/Oropharynx  Lips: normal;  Teeth: normal;  Gums: gingiva normal;  Tongue: normal;  Oral mucosa: normal;  Hard palate: normal;  Soft palate: normal;  Tonsils: normal;  Neck  Neck: neck normal;  Respiratory  Inspection: breathing unlabored; normal breathing rate;  Cardiovascular  Inspection: extremities are warm and well perfused;  Neurovestibular  Mental Status: alert and oriented;       Ear Microscopy with Right Cerumen Removal    Date/Time: 11/5/2024 4:06  PM    Performed by: Efren Martinez APRN  Authorized by: Efren Martinez APRN    Ear examination was performed utilizing binocular microscopy.  Right auricle:   normal:   Right ear canal:   impacted cerumen.   Right tympanic membrane:   retracted. retraction details: pars tensa.   Left auricle:   normal:   Left ear canal:   normal:   Left tympanic membrane:   normal:     Procedure:    Cerumen was removed from the right ear with a suction.   Post-procedure details:     Inspection after the procedure revealed an intact TM.    No medication was applied to the ear canal.    The procedure was tolerated well, no immediate complications.  Comments:      Left ear exam appears improved today, no notable granulation tissue, canal and TM appear relatively normal today.  Right exam reveals some wet cerumen or debris buildup to the anterior sulcus and casted against TM.  This was removed successfully with suction.  Post removal TM appears intact, mild retraction to the pars tensa, she is able to Valsalva well.     Result Review       RESULTS REVIEW    I have reviewed the patients old records in the chart.   The following results/records were reviewed:     CT Temporal Bones With & Without Contrast (10/17/2024 14:17)          Assessment & Plan  Ear pain, bilateral    Tinnitus of both ears    Vertigo    Granulation tissue    Ear pressure, right       Orders Placed This Encounter   Procedures    $ Binocular Microscopy           CT scan reviewed in office with patient, normal.  Otoscopic exam of the left ear appears improved today, granulation appears resolved, canal is dry and normal, TM is intact.  Her right ear does have notable wet appearing cerumen or debris present in the anterior sulcus and against anterior TM.  This was removed during visit, see procedure note.  Postremoval she does report the buzzing in her right ear has improved, unsure if her hearing is perfectly back to normal but states it does feel better.  Will plan to  follow conservatively, she appears to be improving in general.  She will continue conservative measures for now I will plan to recheck again in about 6 to 8 weeks.  If it recheck she has no drainage or further concerns we will obtain audio for baseline evaluation.     Call for ear problems, especially change of hearing, ear pain or dizziness.  Protect getting water in the ears. If needed, may use over the counter silicone plugs or a cotton ball coated with vasoline when bathing.  Use hairdryer on a cool setting after bathing.  Use hearing protection in loud noise situations.  Continue Flonase  Pop ears  Saline spray  Water precautions  No Q-tips to ears    Call with questions or concerns    Return in about 6 weeks (around 12/17/2024) for Recheck ears.        Electronically signed by SOLANGE Ken 11/05/24 4:08 PM CST.

## 2024-11-06 DIAGNOSIS — F51.01 PRIMARY INSOMNIA: ICD-10-CM

## 2024-11-06 NOTE — TELEPHONE ENCOUNTER
Rx Refill Note  Requested Prescriptions     Pending Prescriptions Disp Refills    zolpidem (AMBIEN) 10 MG tablet 30 tablet 2     Sig: Take 1 tablet by mouth At Night As Needed for Sleep.    Patient is due for compliance. She cancelled last appt.   Last office visit with office: 8.27.2024  Next office visit with office:     UDS: 3.21.2024    DATE OF LAST REFILL:     Controlled Substance Agreement: up to date        Allyssa Vaca MA  11/06/24, 14:42 CST

## 2024-11-07 RX ORDER — ZOLPIDEM TARTRATE 10 MG/1
10 TABLET ORAL NIGHTLY PRN
Qty: 30 TABLET | Refills: 0 | Status: SHIPPED | OUTPATIENT
Start: 2024-11-07

## 2024-12-08 DIAGNOSIS — F41.9 ANXIETY: ICD-10-CM

## 2024-12-09 RX ORDER — BUSPIRONE HYDROCHLORIDE 10 MG/1
10 TABLET ORAL 3 TIMES DAILY PRN
Qty: 90 TABLET | Refills: 1 | Status: SHIPPED | OUTPATIENT
Start: 2024-12-09

## 2024-12-09 NOTE — TELEPHONE ENCOUNTER
Rx Refill Note  Requested Prescriptions     Pending Prescriptions Disp Refills    busPIRone (BUSPAR) 10 MG tablet [Pharmacy Med Name: busPIRone HCl 10 MG Oral Tablet] 90 tablet 0     Sig: TAKE 1 TABLET BY MOUTH THREE TIMES DAILY AS NEEDED FOR ANXIETY        Allyssa Vaca MA  12/09/24, 11:30 CST

## 2025-02-06 ENCOUNTER — TELEPHONE (OUTPATIENT)
Dept: FAMILY MEDICINE CLINIC | Facility: CLINIC | Age: 55
End: 2025-02-06
Payer: COMMERCIAL

## 2025-02-06 NOTE — TELEPHONE ENCOUNTER
"LEFT MESSAGE FOR PATIENT TO CALL AND SCHEDULE DIABETIC OFFICE VISIT, WITH LABS. \"HUB TO READ\". THANK YOU.   "

## 2025-03-28 DIAGNOSIS — N95.1 MENOPAUSE SYNDROME: ICD-10-CM

## 2025-03-28 DIAGNOSIS — F33.1 MODERATE EPISODE OF RECURRENT MAJOR DEPRESSIVE DISORDER: ICD-10-CM

## 2025-03-28 DIAGNOSIS — E11.65 TYPE 2 DIABETES MELLITUS WITH HYPERGLYCEMIA, WITHOUT LONG-TERM CURRENT USE OF INSULIN: ICD-10-CM

## 2025-03-31 RX ORDER — SERTRALINE HYDROCHLORIDE 100 MG/1
100 TABLET, FILM COATED ORAL
Qty: 30 TABLET | Refills: 0 | Status: SHIPPED | OUTPATIENT
Start: 2025-03-31

## 2025-03-31 RX ORDER — ESTRADIOL 1 MG/1
1 TABLET ORAL DAILY
Qty: 30 TABLET | Refills: 0 | Status: SHIPPED | OUTPATIENT
Start: 2025-03-31

## 2025-03-31 RX ORDER — METFORMIN HYDROCHLORIDE 500 MG/1
1000 TABLET, EXTENDED RELEASE ORAL 2 TIMES DAILY
Qty: 120 TABLET | Refills: 0 | Status: SHIPPED | OUTPATIENT
Start: 2025-03-31

## 2025-03-31 NOTE — TELEPHONE ENCOUNTER
Rx Refill Note  Requested Prescriptions     Pending Prescriptions Disp Refills    estradiol (ESTRACE) 1 MG tablet [Pharmacy Med Name: Estradiol 1 MG Oral Tablet] 90 tablet 0     Sig: Take 1 tablet by mouth once daily    metFORMIN ER (GLUCOPHAGE-XR) 500 MG 24 hr tablet [Pharmacy Med Name: metFORMIN HCl  MG Oral Tablet Extended Release 24 Hour] 360 tablet 0     Sig: Take 2 tablets by mouth twice daily    sertraline (ZOLOFT) 100 MG tablet [Pharmacy Med Name: Sertraline HCl 100 MG Oral Tablet] 90 tablet 0     Sig: TAKE 1 TABLET BY MOUTH AT BEDTIME      Patient is due for annual and labs prior. Please schedule. 30 days pended.     Allyssa Vaca  03/31/25, 14:36 CDT

## 2025-04-09 ENCOUNTER — OFFICE VISIT (OUTPATIENT)
Dept: FAMILY MEDICINE CLINIC | Facility: CLINIC | Age: 55
End: 2025-04-09
Payer: COMMERCIAL

## 2025-04-09 VITALS
RESPIRATION RATE: 18 BRPM | TEMPERATURE: 98.7 F | DIASTOLIC BLOOD PRESSURE: 80 MMHG | OXYGEN SATURATION: 98 % | SYSTOLIC BLOOD PRESSURE: 128 MMHG | HEART RATE: 89 BPM | HEIGHT: 64 IN | BODY MASS INDEX: 34.15 KG/M2 | WEIGHT: 200 LBS

## 2025-04-09 DIAGNOSIS — F51.01 PRIMARY INSOMNIA: Chronic | ICD-10-CM

## 2025-04-09 DIAGNOSIS — E55.9 VITAMIN D DEFICIENCY: Chronic | ICD-10-CM

## 2025-04-09 DIAGNOSIS — E03.9 ACQUIRED HYPOTHYROIDISM: Chronic | ICD-10-CM

## 2025-04-09 DIAGNOSIS — Z00.00 ENCOUNTER FOR ANNUAL PHYSICAL EXAM: Primary | ICD-10-CM

## 2025-04-09 DIAGNOSIS — J30.2 SEASONAL ALLERGIES: Chronic | ICD-10-CM

## 2025-04-09 DIAGNOSIS — F33.1 MODERATE EPISODE OF RECURRENT MAJOR DEPRESSIVE DISORDER: Chronic | ICD-10-CM

## 2025-04-09 DIAGNOSIS — I10 PRIMARY HYPERTENSION: Chronic | ICD-10-CM

## 2025-04-09 DIAGNOSIS — E66.09 CLASS 1 OBESITY DUE TO EXCESS CALORIES WITH SERIOUS COMORBIDITY AND BODY MASS INDEX (BMI) OF 34.0 TO 34.9 IN ADULT: Chronic | ICD-10-CM

## 2025-04-09 DIAGNOSIS — E66.811 CLASS 1 OBESITY DUE TO EXCESS CALORIES WITH SERIOUS COMORBIDITY AND BODY MASS INDEX (BMI) OF 34.0 TO 34.9 IN ADULT: Chronic | ICD-10-CM

## 2025-04-09 DIAGNOSIS — N95.1 MENOPAUSE SYNDROME: Chronic | ICD-10-CM

## 2025-04-09 DIAGNOSIS — E78.2 MIXED HYPERLIPIDEMIA: Chronic | ICD-10-CM

## 2025-04-09 DIAGNOSIS — Z79.899 LONG-TERM USE OF HIGH-RISK MEDICATION: ICD-10-CM

## 2025-04-09 DIAGNOSIS — E11.65 TYPE 2 DIABETES MELLITUS WITH HYPERGLYCEMIA, WITHOUT LONG-TERM CURRENT USE OF INSULIN: Chronic | ICD-10-CM

## 2025-04-09 PROBLEM — G47.00 INSOMNIA: Status: ACTIVE | Noted: 2025-04-09

## 2025-04-09 LAB
EXPIRATION DATE: ABNORMAL
HBA1C MFR BLD: 6.5 % (ref 4.5–5.7)
Lab: ABNORMAL

## 2025-04-09 RX ORDER — ATORVASTATIN CALCIUM 20 MG/1
20 TABLET, FILM COATED ORAL DAILY
Qty: 90 TABLET | Refills: 1 | Status: SHIPPED | OUTPATIENT
Start: 2025-04-09

## 2025-04-09 RX ORDER — ZOLPIDEM TARTRATE 10 MG/1
10 TABLET ORAL NIGHTLY PRN
Qty: 30 TABLET | Refills: 2 | Status: SHIPPED | OUTPATIENT
Start: 2025-04-09

## 2025-04-09 RX ORDER — METFORMIN HYDROCHLORIDE 500 MG/1
1000 TABLET, EXTENDED RELEASE ORAL 2 TIMES DAILY
Qty: 180 TABLET | Refills: 1 | Status: SHIPPED | OUTPATIENT
Start: 2025-04-09

## 2025-04-09 RX ORDER — AZELASTINE 1 MG/ML
2 SPRAY, METERED NASAL 2 TIMES DAILY
Qty: 30 ML | Refills: 2 | Status: SHIPPED | OUTPATIENT
Start: 2025-04-09

## 2025-04-09 RX ORDER — SERTRALINE HYDROCHLORIDE 100 MG/1
100 TABLET, FILM COATED ORAL
Qty: 90 TABLET | Refills: 1 | Status: SHIPPED | OUTPATIENT
Start: 2025-04-09

## 2025-04-09 RX ORDER — FLUTICASONE PROPIONATE 50 MCG
2 SPRAY, SUSPENSION (ML) NASAL DAILY
Qty: 16 G | Refills: 2 | Status: SHIPPED | OUTPATIENT
Start: 2025-04-09

## 2025-04-09 RX ORDER — FEXOFENADINE HCL 180 MG/1
180 TABLET ORAL DAILY
Qty: 90 TABLET | Refills: 1 | Status: SHIPPED | OUTPATIENT
Start: 2025-04-09

## 2025-04-09 RX ORDER — LOSARTAN POTASSIUM 25 MG/1
12.5 TABLET ORAL DAILY
Qty: 45 TABLET | Refills: 1 | Status: SHIPPED | OUTPATIENT
Start: 2025-04-09

## 2025-04-09 RX ORDER — ESTRADIOL 1 MG/1
1 TABLET ORAL DAILY
Qty: 90 TABLET | Refills: 1 | Status: SHIPPED | OUTPATIENT
Start: 2025-04-09

## 2025-04-09 NOTE — PROGRESS NOTES
"Chief Complaint   Patient presents with    Insomnia     Compliance visit    Med Refill    Diabetes        Subjective   Ghislaine Durbin is a 54 y.o. female who presents today for the following: follow up labs for diabetes and medication refills.     HPI   Patient presents for a compliance visit. Michael is still helping with sleep.   RAJESH, UDS, and controlled substance contract in EMR. Advised patient of risks associated with controlled substances including physical and mental dependence, abuse, and mental impairment. Advised patient to use least amount of possible and never overuse or share medications with other people. Patient states understanding of risks and instructions on proper use.    Patient also presents to review fasting labs and to get refills.   She is also still having trouble with her ears and has seen ENT and has been treated with drops, antibiotic, and antihistamines.    Allergies   Allergen Reactions    Hydrocodone Itching and Hives         OBJECTIVE:  Vitals:    04/09/25 0926   BP: 128/80   BP Location: Left arm   Patient Position: Sitting   Cuff Size: Adult   Pulse: 89   Resp: 18   Temp: 98.7 °F (37.1 °C)   TempSrc: Oral   SpO2: 98%   Weight: 90.7 kg (200 lb)   Height: 162.6 cm (64\")     Physical Exam  Vitals and nursing note reviewed.   Constitutional:       Appearance: Normal appearance. She is obese.   Cardiovascular:      Rate and Rhythm: Normal rate and regular rhythm.      Pulses: Normal pulses.      Heart sounds: Normal heart sounds.   Pulmonary:      Effort: Pulmonary effort is normal.      Breath sounds: Normal breath sounds.   Skin:     General: Skin is warm and dry.   Neurological:      General: No focal deficit present.      Mental Status: She is alert and oriented to person, place, and time.   Psychiatric:         Mood and Affect: Mood normal.         Behavior: Behavior normal.         Thought Content: Thought content normal.         Judgment: Judgment normal.         BMI " is >= 30 and <35. (Class 1 Obesity). The following options were offered after discussion;: weight loss educational material (shared in after visit summary), exercise counseling/recommendations, and nutrition counseling/recommendations      Most Recent A1C          4/9/2025    10:05   HGBA1C Most Recent   Hemoglobin A1C 6.5    Blood work reviewed and discussed with patient      ASSESSMENT/ PLAN:    Diagnoses and all orders for this visit:    1. Encounter for annual physical exam (Primary)  -     CBC Auto Differential  -     Comprehensive Metabolic Panel  -     Lipid Panel With LDL / HDL Ratio  -     T4 & TSH ([facility])    2. Long-term use of high-risk medication  -     Compliance Drug Analysis, Ur - Urine, Random Void    3. Insomnia, unspecified type  -     Compliance Drug Analysis, Ur - Urine, Random Void    4. Type 2 diabetes mellitus with hyperglycemia, without long-term current use of insulin  -     Microalbumin / Creatinine Urine Ratio - Urine, Clean Catch  -     POC Glycosylated Hemoglobin (Hb A1C)  -     losartan (Cozaar) 25 MG tablet; Take 0.5 tablets by mouth Daily.  Dispense: 45 tablet; Refill: 1  -     metFORMIN ER (GLUCOPHAGE-XR) 500 MG 24 hr tablet; Take 2 tablets by mouth 2 (Two) Times a Day.  Dispense: 180 tablet; Refill: 1    5. Primary hypertension  -     losartan (Cozaar) 25 MG tablet; Take 0.5 tablets by mouth Daily.  Dispense: 45 tablet; Refill: 1  -     CBC Auto Differential  -     Comprehensive Metabolic Panel    6. Mixed hyperlipidemia  -     atorvastatin (Lipitor) 20 MG tablet; Take 1 tablet by mouth Daily.  Dispense: 90 tablet; Refill: 1  -     CBC Auto Differential  -     Comprehensive Metabolic Panel  -     Lipid Panel With LDL / HDL Ratio    7. Menopause syndrome  -     estradiol (ESTRACE) 1 MG tablet; Take 1 tablet by mouth Daily.  Dispense: 90 tablet; Refill: 1    8. Moderate episode of recurrent major depressive disorder  -     sertraline (ZOLOFT) 100 MG tablet; Take 1 tablet by mouth  every night at bedtime.  Dispense: 90 tablet; Refill: 1    9. Primary insomnia  -     zolpidem (AMBIEN) 10 MG tablet; Take 1 tablet by mouth At Night As Needed for Sleep.  Dispense: 30 tablet; Refill: 2    10. Seasonal allergies  -     azelastine (ASTELIN) 0.1 % nasal spray; Administer 2 sprays into the nostril(s) as directed by provider 2 (Two) Times a Day. Use in each nostril as directed  Dispense: 30 mL; Refill: 2  -     fluticasone (FLONASE) 50 MCG/ACT nasal spray; Administer 2 sprays into the nostril(s) as directed by provider Daily.  Dispense: 16 g; Refill: 2  -     fexofenadine (ALLEGRA) 180 MG tablet; Take 1 tablet by mouth Daily.  Dispense: 90 tablet; Refill: 1    11. Acquired hypothyroidism  -     T4 & TSH ([facility])    12. Vitamin D deficiency  -     Vitamin D,25-Hydroxy    13. Class 1 obesity due to excess calories with serious comorbidity and body mass index (BMI) of 34.0 to 34.9 in adult      Procedures     Management Plan:   Have fasting labs drawn and return for annual physical.  An After Visit Summary was printed and given to the patient at discharge.    Follow-up: Return for ANNUAL PHYSICAL WITH LABS PRIOR.         SOLANGE Pugh 4/9/2025 13:00 CDT  This note was electronically signed.

## 2025-04-10 LAB
25(OH)D3+25(OH)D2 SERPL-MCNC: 26.5 NG/ML (ref 30–100)
ALBUMIN SERPL-MCNC: 4.6 G/DL (ref 3.8–4.9)
ALBUMIN/CREAT UR: 10 MG/G CREAT (ref 0–29)
ALP SERPL-CCNC: 95 IU/L (ref 44–121)
ALT SERPL-CCNC: 21 IU/L (ref 0–32)
AST SERPL-CCNC: 15 IU/L (ref 0–40)
BASOPHILS # BLD AUTO: 0 X10E3/UL (ref 0–0.2)
BASOPHILS NFR BLD AUTO: 1 %
BILIRUB SERPL-MCNC: 0.7 MG/DL (ref 0–1.2)
BUN SERPL-MCNC: 21 MG/DL (ref 6–24)
BUN/CREAT SERPL: 28 (ref 9–23)
CALCIUM SERPL-MCNC: 9.4 MG/DL (ref 8.7–10.2)
CHLORIDE SERPL-SCNC: 102 MMOL/L (ref 96–106)
CHOLEST SERPL-MCNC: 132 MG/DL (ref 100–199)
CO2 SERPL-SCNC: 24 MMOL/L (ref 20–29)
CREAT SERPL-MCNC: 0.75 MG/DL (ref 0.57–1)
CREAT UR-MCNC: 138.6 MG/DL
EGFRCR SERPLBLD CKD-EPI 2021: 93 ML/MIN/1.73
EOSINOPHIL # BLD AUTO: 0.1 X10E3/UL (ref 0–0.4)
EOSINOPHIL NFR BLD AUTO: 2 %
ERYTHROCYTE [DISTWIDTH] IN BLOOD BY AUTOMATED COUNT: 12.7 % (ref 11.7–15.4)
GLOBULIN SER CALC-MCNC: 2.5 G/DL (ref 1.5–4.5)
GLUCOSE SERPL-MCNC: 139 MG/DL (ref 70–99)
HCT VFR BLD AUTO: 43.8 % (ref 34–46.6)
HDLC SERPL-MCNC: 48 MG/DL
HGB BLD-MCNC: 14.4 G/DL (ref 11.1–15.9)
IMM GRANULOCYTES # BLD AUTO: 0 X10E3/UL (ref 0–0.1)
IMM GRANULOCYTES NFR BLD AUTO: 0 %
LDLC SERPL CALC-MCNC: 57 MG/DL (ref 0–99)
LDLC/HDLC SERPL: 1.2 RATIO (ref 0–3.2)
LYMPHOCYTES # BLD AUTO: 1.9 X10E3/UL (ref 0.7–3.1)
LYMPHOCYTES NFR BLD AUTO: 27 %
MCH RBC QN AUTO: 27.8 PG (ref 26.6–33)
MCHC RBC AUTO-ENTMCNC: 32.9 G/DL (ref 31.5–35.7)
MCV RBC AUTO: 85 FL (ref 79–97)
MICROALBUMIN UR-MCNC: 14.5 UG/ML
MONOCYTES # BLD AUTO: 0.4 X10E3/UL (ref 0.1–0.9)
MONOCYTES NFR BLD AUTO: 6 %
NEUTROPHILS # BLD AUTO: 4.5 X10E3/UL (ref 1.4–7)
NEUTROPHILS NFR BLD AUTO: 64 %
PLATELET # BLD AUTO: 297 X10E3/UL (ref 150–450)
POTASSIUM SERPL-SCNC: 4.4 MMOL/L (ref 3.5–5.2)
PROT SERPL-MCNC: 7.1 G/DL (ref 6–8.5)
RBC # BLD AUTO: 5.18 X10E6/UL (ref 3.77–5.28)
SODIUM SERPL-SCNC: 141 MMOL/L (ref 134–144)
T4 SERPL-MCNC: 6.3 UG/DL (ref 4.5–12)
TRIGL SERPL-MCNC: 158 MG/DL (ref 0–149)
TSH SERPL DL<=0.005 MIU/L-ACNC: 2.07 UIU/ML (ref 0.45–4.5)
VLDLC SERPL CALC-MCNC: 27 MG/DL (ref 5–40)
WBC # BLD AUTO: 7 X10E3/UL (ref 3.4–10.8)

## 2025-04-16 LAB — DRUGS UR: NORMAL

## 2025-04-30 ENCOUNTER — OFFICE VISIT (OUTPATIENT)
Dept: FAMILY MEDICINE CLINIC | Facility: CLINIC | Age: 55
End: 2025-04-30
Payer: COMMERCIAL

## 2025-04-30 VITALS
HEIGHT: 64 IN | BODY MASS INDEX: 34.15 KG/M2 | DIASTOLIC BLOOD PRESSURE: 84 MMHG | RESPIRATION RATE: 16 BRPM | TEMPERATURE: 98 F | SYSTOLIC BLOOD PRESSURE: 124 MMHG | OXYGEN SATURATION: 97 % | HEART RATE: 88 BPM | WEIGHT: 200 LBS

## 2025-04-30 DIAGNOSIS — E66.811 CLASS 1 OBESITY DUE TO EXCESS CALORIES WITH SERIOUS COMORBIDITY AND BODY MASS INDEX (BMI) OF 34.0 TO 34.9 IN ADULT: Chronic | ICD-10-CM

## 2025-04-30 DIAGNOSIS — E55.9 VITAMIN D DEFICIENCY: Chronic | ICD-10-CM

## 2025-04-30 DIAGNOSIS — I10 PRIMARY HYPERTENSION: Chronic | ICD-10-CM

## 2025-04-30 DIAGNOSIS — E11.65 TYPE 2 DIABETES MELLITUS WITH HYPERGLYCEMIA, WITHOUT LONG-TERM CURRENT USE OF INSULIN: Chronic | ICD-10-CM

## 2025-04-30 DIAGNOSIS — Z00.00 ENCOUNTER FOR ANNUAL PHYSICAL EXAM: Primary | ICD-10-CM

## 2025-04-30 DIAGNOSIS — E78.2 MIXED HYPERLIPIDEMIA: Chronic | ICD-10-CM

## 2025-04-30 DIAGNOSIS — F41.9 ANXIETY: Chronic | ICD-10-CM

## 2025-04-30 DIAGNOSIS — F33.1 MODERATE EPISODE OF RECURRENT MAJOR DEPRESSIVE DISORDER: Chronic | ICD-10-CM

## 2025-04-30 DIAGNOSIS — E66.09 CLASS 1 OBESITY DUE TO EXCESS CALORIES WITH SERIOUS COMORBIDITY AND BODY MASS INDEX (BMI) OF 34.0 TO 34.9 IN ADULT: Chronic | ICD-10-CM

## 2025-04-30 PROCEDURE — 99396 PREV VISIT EST AGE 40-64: CPT | Performed by: NURSE PRACTITIONER

## 2025-04-30 RX ORDER — ERGOCALCIFEROL 1.25 MG/1
50000 CAPSULE, LIQUID FILLED ORAL WEEKLY
Qty: 12 CAPSULE | Refills: 3 | Status: SHIPPED | OUTPATIENT
Start: 2025-04-30

## 2025-04-30 NOTE — PROGRESS NOTES
"Chief Complaint   Patient presents with    Annual Exam        Subjective   Ghislaine Durbin is a 54 y.o. female who presents today for the following: annual exam with lab follow up.    HPI   Patient does have seasonal allergies.  Insomnia, diabetes, and depression are controlled with medications.      Allergies   Allergen Reactions    Hydrocodone Itching and Hives         OBJECTIVE:  Vitals:    04/30/25 0914   BP: 124/84   BP Location: Left arm   Patient Position: Sitting   Cuff Size: Adult   Pulse: 88   Resp: 16   Temp: 98 °F (36.7 °C)   TempSrc: Infrared   SpO2: 97%   Weight: 90.7 kg (200 lb)   Height: 162.6 cm (64\")     Physical Exam  Vitals and nursing note reviewed.   Constitutional:       Appearance: Normal appearance.   HENT:      Head: Normocephalic and atraumatic.      Right Ear: Tympanic membrane, ear canal and external ear normal.      Left Ear: Tympanic membrane, ear canal and external ear normal.      Nose: Nose normal.      Mouth/Throat:      Mouth: Mucous membranes are moist.      Pharynx: Oropharynx is clear.   Eyes:      Extraocular Movements: Extraocular movements intact.      Pupils: Pupils are equal, round, and reactive to light.   Cardiovascular:      Rate and Rhythm: Normal rate and regular rhythm.      Pulses: Normal pulses.      Heart sounds: Normal heart sounds.   Pulmonary:      Effort: Pulmonary effort is normal.      Breath sounds: Normal breath sounds.   Abdominal:      General: Abdomen is flat. Bowel sounds are normal.      Palpations: Abdomen is soft.   Musculoskeletal:         General: Normal range of motion.      Cervical back: Normal range of motion and neck supple.   Skin:     General: Skin is warm and dry.      Capillary Refill: Capillary refill takes less than 2 seconds.   Neurological:      General: No focal deficit present.      Mental Status: She is alert and oriented to person, place, and time.   Psychiatric:         Mood and Affect: Mood normal.         Behavior: " Behavior normal.         Thought Content: Thought content normal.         Judgment: Judgment normal.       BMI is >= 30 and <35. (Class 1 Obesity). The following options were offered after discussion;: weight loss educational material (shared in after visit summary), exercise counseling/recommendations, and nutrition counseling/recommendations          CMP          10/3/2024    07:50 10/17/2024    14:07 4/8/2025    23:00   CMP   Glucose 133   139    BUN 15   21    Creatinine 0.73  0.80  0.75    EGFR 96   93    Sodium 140   141    Potassium 4.3   4.4    Chloride 102   102    Calcium 9.6   9.4    Total Protein 7.0   7.1    Albumin 4.4   4.6    Globulin 2.6   2.5    Total Bilirubin 0.6   0.7    Alkaline Phosphatase 69   95    AST (SGOT) 14   15    ALT (SGPT) 14   21    BUN/Creatinine Ratio 21   28      CBC w/diff          4/8/2025    23:00   CBC w/Diff   WBC 7.0    RBC 5.18    Hemoglobin 14.4    Hematocrit 43.8    MCV 85    MCH 27.8    MCHC 32.9    RDW 12.7    Platelets 297    Neutrophil Rel % 64    Lymphocyte Rel % 27    Monocyte Rel % 6    Eosinophil Rel % 2    Basophil Rel % 1      Lipid Panel          4/8/2025    23:00   Lipid Panel   Total Cholesterol 132    Triglycerides 158    HDL Cholesterol 48    VLDL Cholesterol 27    LDL Cholesterol  57    LDL/HDL Ratio 1.2      TSH          4/8/2025    23:00   TSH   TSH 2.070      Most Recent A1C          4/9/2025    10:05   HGBA1C Most Recent   Hemoglobin A1C 6.5      Microalbumin          4/9/2025    08:36   Microalbumin   Microalbumin, Urine 14.5    Blood work reviewed and discussed with patient      ASSESSMENT/ PLAN:    Diagnoses and all orders for this visit:    1. Encounter for annual physical exam (Primary)    2. Vitamin D deficiency  -     vitamin D (ERGOCALCIFEROL) 1.25 MG (24800 UT) capsule capsule; Take 1 capsule by mouth 1 (One) Time Per Week.  Dispense: 12 capsule; Refill: 3    3. Class 1 obesity due to excess calories with serious comorbidity and body mass  index (BMI) of 34.0 to 34.9 in adult    4. Type 2 diabetes mellitus with hyperglycemia, without long-term current use of insulin    5. Primary hypertension    6. Mixed hyperlipidemia    7. Moderate episode of recurrent major depressive disorder    8. Anxiety      Procedures     Management Plan:   Continue present medications   An After Visit Summary was printed and given to the patient at discharge.  MEDICATION Instructions:     Encouraged patient to continue routine medicines as prescribed and maintain compliance. Patient instructed to report any adverse side effects or reactions to medicines promptly to the office. Patient instructed to make us aware of any OTC or herbal meds or supplement use.           DIET Recommendations:       Patient instructed and provided information on the following nutrition and diet recommendations: Calorie restriction for weight reduction and maintenance. Necessity for adequate daily intake of fluids/water. Also, diet information provided in AVS for specifics.           EXERCISE Instructions:         Discussed with patient the need for routine aerobic activity for cardiovascular fitness, 3 times a week for about 30 minutes. Daily exercise for increased fitness and weight reduction goals.         HEALTH MAINTENANCE:                 Counseling provided to patient/family about routine health maintenance and ANNUAL physicals/labs. Counseling on recommended Vaccinations appropriate for age needed.   Follow-up: Return in about 3 months (around 7/30/2025) for Recheck with labs prior.         SOLANGE Pugh 4/30/2025 12:08 CDT  This note was electronically signed.

## 2025-05-12 ENCOUNTER — TELEPHONE (OUTPATIENT)
Dept: FAMILY MEDICINE CLINIC | Facility: CLINIC | Age: 55
End: 2025-05-12
Payer: COMMERCIAL

## 2025-05-12 NOTE — TELEPHONE ENCOUNTER
I have indexed the MRI from [facility]- can you result that?   Patient is messaging wondering about results.

## 2025-07-16 DIAGNOSIS — I10 PRIMARY HYPERTENSION: Chronic | ICD-10-CM

## 2025-07-16 DIAGNOSIS — E11.65 TYPE 2 DIABETES MELLITUS WITH HYPERGLYCEMIA, WITHOUT LONG-TERM CURRENT USE OF INSULIN: Chronic | ICD-10-CM

## 2025-07-16 RX ORDER — LOSARTAN POTASSIUM 25 MG/1
12.5 TABLET ORAL DAILY
Qty: 45 TABLET | Refills: 0 | Status: SHIPPED | OUTPATIENT
Start: 2025-07-16

## 2025-08-25 DIAGNOSIS — E11.65 TYPE 2 DIABETES MELLITUS WITH HYPERGLYCEMIA, WITHOUT LONG-TERM CURRENT USE OF INSULIN: Chronic | ICD-10-CM

## 2025-08-25 RX ORDER — METFORMIN HYDROCHLORIDE 500 MG/1
1000 TABLET, EXTENDED RELEASE ORAL 2 TIMES DAILY
Qty: 180 TABLET | Refills: 1 | Status: SHIPPED | OUTPATIENT
Start: 2025-08-25